# Patient Record
Sex: FEMALE | Race: WHITE | Employment: OTHER | URBAN - METROPOLITAN AREA
[De-identification: names, ages, dates, MRNs, and addresses within clinical notes are randomized per-mention and may not be internally consistent; named-entity substitution may affect disease eponyms.]

---

## 2020-02-20 LAB
CREATININE, URINE: 218.1
MICROALBUMIN/CREAT 24H UR: 45.3 MG/G{CREAT}
MICROALBUMIN/CREAT UR-RTO: 20.8

## 2020-05-07 LAB — DIABETIC RETINOPATHY: NEGATIVE

## 2020-11-05 LAB
ALBUMIN SERPL-MCNC: NORMAL G/DL
ALP BLD-CCNC: NORMAL U/L
ALT SERPL-CCNC: NORMAL U/L
ANION GAP SERPL CALCULATED.3IONS-SCNC: NORMAL MMOL/L
AST SERPL-CCNC: NORMAL U/L
AVERAGE GLUCOSE: NORMAL
BASOPHILS ABSOLUTE: NORMAL
BASOPHILS RELATIVE PERCENT: NORMAL
BILIRUB SERPL-MCNC: NORMAL MG/DL
BUN BLDV-MCNC: NORMAL MG/DL
CALCIUM SERPL-MCNC: NORMAL MG/DL
CHLORIDE BLD-SCNC: NORMAL MMOL/L
CHOLESTEROL, TOTAL: NORMAL
CHOLESTEROL/HDL RATIO: NORMAL
CO2: NORMAL
CREAT SERPL-MCNC: NORMAL MG/DL
EOSINOPHILS ABSOLUTE: NORMAL
EOSINOPHILS RELATIVE PERCENT: NORMAL
GFR CALCULATED: NORMAL
GLUCOSE BLD-MCNC: NORMAL MG/DL
HBA1C MFR BLD: 6.6 %
HCT VFR BLD CALC: NORMAL %
HDLC SERPL-MCNC: NORMAL MG/DL
HEMOGLOBIN: NORMAL
LDL CHOLESTEROL CALCULATED: NORMAL
LYMPHOCYTES ABSOLUTE: NORMAL
LYMPHOCYTES RELATIVE PERCENT: NORMAL
MCH RBC QN AUTO: NORMAL PG
MCHC RBC AUTO-ENTMCNC: NORMAL G/DL
MCV RBC AUTO: NORMAL FL
MONOCYTES ABSOLUTE: NORMAL
MONOCYTES RELATIVE PERCENT: NORMAL
NEUTROPHILS ABSOLUTE: NORMAL
NEUTROPHILS RELATIVE PERCENT: NORMAL
NONHDLC SERPL-MCNC: NORMAL MG/DL
PLATELET # BLD: NORMAL 10*3/UL
PMV BLD AUTO: NORMAL FL
POTASSIUM SERPL-SCNC: NORMAL MMOL/L
RBC # BLD: NORMAL 10*6/UL
SODIUM BLD-SCNC: NORMAL MMOL/L
TOTAL PROTEIN: NORMAL
TRIGL SERPL-MCNC: NORMAL MG/DL
VLDLC SERPL CALC-MCNC: NORMAL MG/DL
WBC # BLD: NORMAL 10*3/UL

## 2021-01-24 ENCOUNTER — PATIENT MESSAGE (OUTPATIENT)
Dept: PRIMARY CARE CLINIC | Age: 66
End: 2021-01-24

## 2021-01-25 VITALS
HEIGHT: 63 IN | DIASTOLIC BLOOD PRESSURE: 78 MMHG | WEIGHT: 246 LBS | RESPIRATION RATE: 18 BRPM | HEART RATE: 55 BPM | BODY MASS INDEX: 43.59 KG/M2 | SYSTOLIC BLOOD PRESSURE: 132 MMHG | TEMPERATURE: 97.2 F

## 2021-01-25 RX ORDER — ATORVASTATIN CALCIUM 40 MG/1
1 TABLET, FILM COATED ORAL DAILY
COMMUNITY
Start: 2021-01-05 | End: 2021-06-25 | Stop reason: SDUPTHER

## 2021-01-25 RX ORDER — LEVOTHYROXINE SODIUM 0.1 MG/1
1 TABLET ORAL DAILY
COMMUNITY
Start: 2020-10-27 | End: 2021-01-25 | Stop reason: SDUPTHER

## 2021-01-25 RX ORDER — LEVOTHYROXINE SODIUM 0.1 MG/1
100 TABLET ORAL DAILY
Qty: 90 TABLET | Refills: 3 | Status: SHIPPED
Start: 2021-01-25 | End: 2021-01-27 | Stop reason: SDUPTHER

## 2021-01-25 NOTE — TELEPHONE ENCOUNTER
From: Tommy Michel  To: Miguelangel Marx MD  Sent: 1/24/2021 7:15 PM EST  Subject: Prescription Question    Please refill my levothyroxine sodium . 100 mg (100 mcg) tablet 1x per day prescription.     My local pharmacy was added to my profile:  [facility]  [place] [phone]    Thank you

## 2021-01-27 RX ORDER — LEVOTHYROXINE SODIUM 0.1 MG/1
100 TABLET ORAL DAILY
Qty: 90 TABLET | Refills: 3 | Status: SHIPPED
Start: 2021-01-27 | End: 2022-01-13 | Stop reason: SDUPTHER

## 2021-03-12 ENCOUNTER — IMMUNIZATION (OUTPATIENT)
Dept: PRIMARY CARE CLINIC | Age: 66
End: 2021-03-12
Payer: MEDICARE

## 2021-03-12 PROCEDURE — 91301 COVID-19, MODERNA VACCINE 100MCG/0.5ML DOSE: CPT | Performed by: PHYSICIAN ASSISTANT

## 2021-03-12 PROCEDURE — 0011A COVID-19, MODERNA VACCINE 100MCG/0.5ML DOSE: CPT | Performed by: PHYSICIAN ASSISTANT

## 2021-03-24 DIAGNOSIS — E03.9 HYPOTHYROIDISM, UNSPECIFIED TYPE: ICD-10-CM

## 2021-03-24 DIAGNOSIS — R73.03 PREDIABETES: ICD-10-CM

## 2021-03-24 DIAGNOSIS — I10 ESSENTIAL HYPERTENSION: ICD-10-CM

## 2021-03-24 DIAGNOSIS — E78.5 HYPERLIPIDEMIA, UNSPECIFIED HYPERLIPIDEMIA TYPE: ICD-10-CM

## 2021-03-24 LAB
ALBUMIN SERPL-MCNC: 4.2 G/DL (ref 3.5–5.2)
ALP BLD-CCNC: 87 U/L (ref 35–104)
ALT SERPL-CCNC: 17 U/L (ref 0–32)
ANION GAP SERPL CALCULATED.3IONS-SCNC: 10 MMOL/L (ref 7–16)
AST SERPL-CCNC: 24 U/L (ref 0–31)
BASOPHILS ABSOLUTE: 0.03 E9/L (ref 0–0.2)
BASOPHILS RELATIVE PERCENT: 0.3 % (ref 0–2)
BILIRUB SERPL-MCNC: 0.5 MG/DL (ref 0–1.2)
BUN BLDV-MCNC: 20 MG/DL (ref 8–23)
CALCIUM SERPL-MCNC: 9.6 MG/DL (ref 8.6–10.2)
CHLORIDE BLD-SCNC: 103 MMOL/L (ref 98–107)
CHOLESTEROL, TOTAL: 149 MG/DL (ref 0–199)
CO2: 27 MMOL/L (ref 22–29)
CREAT SERPL-MCNC: 1 MG/DL (ref 0.5–1)
EOSINOPHILS ABSOLUTE: 0.07 E9/L (ref 0.05–0.5)
EOSINOPHILS RELATIVE PERCENT: 0.7 % (ref 0–6)
GFR AFRICAN AMERICAN: >60
GFR NON-AFRICAN AMERICAN: 55 ML/MIN/1.73
GLUCOSE BLD-MCNC: 124 MG/DL (ref 74–99)
HBA1C MFR BLD: 6.6 % (ref 4–5.6)
HCT VFR BLD CALC: 44.2 % (ref 34–48)
HDLC SERPL-MCNC: 50 MG/DL
HEMOGLOBIN: 14.9 G/DL (ref 11.5–15.5)
IMMATURE GRANULOCYTES #: 0.04 E9/L
IMMATURE GRANULOCYTES %: 0.4 % (ref 0–5)
LDL CHOLESTEROL CALCULATED: 81 MG/DL (ref 0–99)
LYMPHOCYTES ABSOLUTE: 2.49 E9/L (ref 1.5–4)
LYMPHOCYTES RELATIVE PERCENT: 23.6 % (ref 20–42)
MCH RBC QN AUTO: 32.3 PG (ref 26–35)
MCHC RBC AUTO-ENTMCNC: 33.7 % (ref 32–34.5)
MCV RBC AUTO: 95.7 FL (ref 80–99.9)
MONOCYTES ABSOLUTE: 1.05 E9/L (ref 0.1–0.95)
MONOCYTES RELATIVE PERCENT: 10 % (ref 2–12)
NEUTROPHILS ABSOLUTE: 6.85 E9/L (ref 1.8–7.3)
NEUTROPHILS RELATIVE PERCENT: 65 % (ref 43–80)
PDW BLD-RTO: 12.7 FL (ref 11.5–15)
PLATELET # BLD: 245 E9/L (ref 130–450)
PMV BLD AUTO: 10.1 FL (ref 7–12)
POTASSIUM SERPL-SCNC: 4.1 MMOL/L (ref 3.5–5)
RBC # BLD: 4.62 E12/L (ref 3.5–5.5)
SODIUM BLD-SCNC: 140 MMOL/L (ref 132–146)
TOTAL PROTEIN: 7.4 G/DL (ref 6.4–8.3)
TRIGL SERPL-MCNC: 91 MG/DL (ref 0–149)
TSH SERPL DL<=0.05 MIU/L-ACNC: 2.18 UIU/ML (ref 0.27–4.2)
VLDLC SERPL CALC-MCNC: 18 MG/DL
WBC # BLD: 10.5 E9/L (ref 4.5–11.5)

## 2021-04-07 ENCOUNTER — OFFICE VISIT (OUTPATIENT)
Dept: PRIMARY CARE CLINIC | Age: 66
End: 2021-04-07
Payer: MEDICARE

## 2021-04-07 VITALS
HEART RATE: 64 BPM | RESPIRATION RATE: 17 BRPM | WEIGHT: 250 LBS | SYSTOLIC BLOOD PRESSURE: 120 MMHG | HEIGHT: 63 IN | OXYGEN SATURATION: 96 % | TEMPERATURE: 98 F | BODY MASS INDEX: 44.3 KG/M2 | DIASTOLIC BLOOD PRESSURE: 62 MMHG

## 2021-04-07 DIAGNOSIS — E66.01 CLASS 3 SEVERE OBESITY DUE TO EXCESS CALORIES WITH SERIOUS COMORBIDITY AND BODY MASS INDEX (BMI) OF 40.0 TO 44.9 IN ADULT (HCC): ICD-10-CM

## 2021-04-07 DIAGNOSIS — R26.9 GAIT ABNORMALITY: ICD-10-CM

## 2021-04-07 DIAGNOSIS — M17.0 PRIMARY OSTEOARTHRITIS OF BOTH KNEES: Primary | ICD-10-CM

## 2021-04-07 DIAGNOSIS — E11.9 TYPE 2 DIABETES MELLITUS WITHOUT COMPLICATION, WITHOUT LONG-TERM CURRENT USE OF INSULIN (HCC): ICD-10-CM

## 2021-04-07 DIAGNOSIS — E03.9 HYPOTHYROIDISM, UNSPECIFIED TYPE: ICD-10-CM

## 2021-04-07 PROCEDURE — 99214 OFFICE O/P EST MOD 30 MIN: CPT | Performed by: INTERNAL MEDICINE

## 2021-04-07 RX ORDER — VALSARTAN AND HYDROCHLOROTHIAZIDE 320; 25 MG/1; MG/1
TABLET, FILM COATED ORAL
COMMUNITY
Start: 2021-03-02 | End: 2021-08-27 | Stop reason: SDUPTHER

## 2021-04-07 RX ORDER — METOPROLOL TARTRATE 50 MG/1
TABLET, FILM COATED ORAL
COMMUNITY
Start: 2021-03-01 | End: 2021-06-08 | Stop reason: SDUPTHER

## 2021-04-07 RX ORDER — METFORMIN HYDROCHLORIDE 750 MG/1
TABLET, EXTENDED RELEASE ORAL
COMMUNITY
Start: 2021-03-01 | End: 2021-08-30 | Stop reason: SDUPTHER

## 2021-04-07 RX ORDER — SEMAGLUTIDE 1.34 MG/ML
0.5 INJECTION, SOLUTION SUBCUTANEOUS
Qty: 1 PEN | Refills: 0 | Status: SHIPPED | OUTPATIENT
Start: 2021-04-07 | End: 2021-05-06

## 2021-04-07 SDOH — HEALTH STABILITY: MENTAL HEALTH: HOW OFTEN DO YOU HAVE A DRINK CONTAINING ALCOHOL?: NEVER

## 2021-04-07 SDOH — ECONOMIC STABILITY: TRANSPORTATION INSECURITY
IN THE PAST 12 MONTHS, HAS THE LACK OF TRANSPORTATION KEPT YOU FROM MEDICAL APPOINTMENTS OR FROM GETTING MEDICATIONS?: NO

## 2021-04-07 SDOH — ECONOMIC STABILITY: FOOD INSECURITY: WITHIN THE PAST 12 MONTHS, THE FOOD YOU BOUGHT JUST DIDN'T LAST AND YOU DIDN'T HAVE MONEY TO GET MORE.: NEVER TRUE

## 2021-04-07 SDOH — ECONOMIC STABILITY: FOOD INSECURITY: WITHIN THE PAST 12 MONTHS, YOU WORRIED THAT YOUR FOOD WOULD RUN OUT BEFORE YOU GOT MONEY TO BUY MORE.: NEVER TRUE

## 2021-04-07 SDOH — ECONOMIC STABILITY: INCOME INSECURITY: HOW HARD IS IT FOR YOU TO PAY FOR THE VERY BASICS LIKE FOOD, HOUSING, MEDICAL CARE, AND HEATING?: NOT ASKED

## 2021-04-07 SDOH — ECONOMIC STABILITY: TRANSPORTATION INSECURITY
IN THE PAST 12 MONTHS, HAS LACK OF TRANSPORTATION KEPT YOU FROM MEETINGS, WORK, OR FROM GETTING THINGS NEEDED FOR DAILY LIVING?: NO

## 2021-04-07 ASSESSMENT — ENCOUNTER SYMPTOMS
ABDOMINAL PAIN: 0
FACIAL SWELLING: 0
WHEEZING: 0
SHORTNESS OF BREATH: 1
COUGH: 0
CONSTIPATION: 0
DIARRHEA: 0

## 2021-04-07 ASSESSMENT — PATIENT HEALTH QUESTIONNAIRE - PHQ9
2. FEELING DOWN, DEPRESSED OR HOPELESS: 0
SUM OF ALL RESPONSES TO PHQ9 QUESTIONS 1 & 2: 0
1. LITTLE INTEREST OR PLEASURE IN DOING THINGS: 0
SUM OF ALL RESPONSES TO PHQ QUESTIONS 1-9: 0
SUM OF ALL RESPONSES TO PHQ QUESTIONS 1-9: 0

## 2021-04-07 NOTE — PROGRESS NOTES
HPI:  Patient comes in today for complaint of pain in the knees bilaterally patient ability to ambulate and exercise is impaired, patient has bone-on-bone knees bilaterally and is unable to get surgery done because she is unable to lose weight patient has seen for surgeons in the past last surgeon was Dr. Dr. Kavon Mcintosh, patient requested to lose 40 pounds in order to have surgery done. Patient have tried dieting several times and failed. Patient very frustrated  Review of Systems   Constitutional: Negative for chills, fatigue and fever. HENT: Negative for congestion, facial swelling and postnasal drip. Respiratory: Positive for shortness of breath (With exertion. ). Negative for cough and wheezing. Cardiovascular: Negative for chest pain, palpitations and leg swelling. Gastrointestinal: Negative for abdominal pain, constipation and diarrhea. Genitourinary: Negative. Musculoskeletal: Positive for arthralgias and gait problem. Negative for myalgias. Skin: Negative. Psychiatric/Behavioral: Negative. Past Medical History:   Diagnosis Date    Chronic kidney disease     Diabetes mellitus ([facility] [address]     Hearing loss     Hypothyroidism     Obesity     Osteoarthritis     Thoracic and lumbosacral neuritis     Trochanteric bursitis      No past surgical history on file. No family history on file.    Social History     Tobacco Use    Smoking status: Former Smoker     Packs/day: 0.25     Years: 10.00     Pack years: 2.50     Types: Cigarettes     Quit date: 1981     Years since quittin.2    Smokeless tobacco: Never Used   Substance Use Topics    Alcohol use: Never     Frequency: Never     Binge frequency: Never    Drug use: Not on file        Current Outpatient Medications on File Prior to Visit   Medication Sig Dispense Refill    metFORMIN (GLUCOPHAGE-XR) 750 MG extended release tablet TAKE 1 TABLET BY MOUTH WITH BREAKFAST AND 1 TABLET BY MOUTH WITH DINNER EVERY DAY      metoprolol tartrate (LOPRESSOR) 50 MG tablet TAKE 1 TABLET BY MOUTH TWICE DAILY      valsartan-hydroCHLOROthiazide (DIOVAN-HCT) 320-25 MG per tablet TAKE 1 TABLET BY MOUTH DAILY FOR HIGH BLOOD PRESSURE      levothyroxine (SYNTHROID) 100 MCG tablet Take 1 tablet by mouth daily 90 tablet 3    atorvastatin (LIPITOR) 40 MG tablet Take 1 tablet by mouth daily       No current facility-administered medications on file prior to visit. PE:  VS:  /62 (Site: Left Wrist, Position: Sitting, Cuff Size: Large Adult)   Pulse 64   Temp 98 °F (36.7 °C) (Temporal)   Resp 17   Ht 5' 3\" (1.6 m)   Wt 250 lb (113.4 kg)   SpO2 96%   BMI 44.29 kg/m²   General:  Alert and oriented, NAD  HEENT:  TMs, EACs and auricles are normal, ALBERTO, EOMI, Conjunctivae clear       Throat currently clear. NECK:  Supple without adenopathy or thyromegaly, no carotid bruits. LUNGS:  CTA all fields  HEART:  RRR without M, R, or G  ABDOMEN:  Soft and nontender without palpable abnormalities, No renal or aortic bruits. EXTREMITIES:  Without clubbing, cyanosis, or edema, 2+ DP/PT pulses B    Lab Results   Component Value Date    WBC 10.5 03/24/2021    HGB 14.9 03/24/2021    HCT 44.2 03/24/2021     03/24/2021    CHOL 149 03/24/2021    TRIG 91 03/24/2021    HDL 50 03/24/2021    ALT 17 03/24/2021    AST 24 03/24/2021     03/24/2021    K 4.1 03/24/2021     03/24/2021    CREATININE 1.0 03/24/2021    BUN 20 03/24/2021    CO2 27 03/24/2021    TSH 2.180 03/24/2021    LABA1C 6.6 (H) 03/24/2021        Impression/Plan:    1. Primary osteoarthritis of both knees  -     [facility]  2. Gait abnormality  -     [facility] [place]  3.  Type 2 diabetes mellitus without complication, without long-term current use of insulin ([facility] [address]  Assessment & Plan:  Patient will be started on Ozempic to continue control of her diabetes, may have to switch to Trulicity because of formulary patient started on a sample of Ozempic and will check coverage with her pharmacy. 4. Hypothyroidism, unspecified type  5. Class 3 severe obesity due to excess calories with serious comorbidity and body mass index (BMI) of 40.0 to 44.9 in adult [facility])  Assessment & Plan:  Patient is unable to lose weight because of difficulty with mobility, patient is willing to add Ozempic to her Metformin in an attempt to use it for 4 to 6 months try to improve her weight so she can be eligible for surgery for her knees, discussed with the patient possible adverse effects, patient has no personal or family history of thyroid cancer.   Mobility and

## 2021-04-08 NOTE — ASSESSMENT & PLAN NOTE
Patient is unable to lose weight because of difficulty with mobility, patient is willing to add Ozempic to her Metformin in an attempt to use it for 4 to 6 months try to improve her weight so she can be eligible for surgery for her knees, discussed with the patient possible adverse effects, patient has no personal or family history of thyroid cancer.   Mobility and

## 2021-04-12 ENCOUNTER — IMMUNIZATION (OUTPATIENT)
Dept: PRIMARY CARE CLINIC | Age: 66
End: 2021-04-12
Payer: MEDICARE

## 2021-04-12 PROCEDURE — 91301 COVID-19, MODERNA VACCINE 100MCG/0.5ML DOSE: CPT | Performed by: PHYSICIAN ASSISTANT

## 2021-04-12 PROCEDURE — 0012A COVID-19, MODERNA VACCINE 100MCG/0.5ML DOSE: CPT | Performed by: PHYSICIAN ASSISTANT

## 2021-04-13 ENCOUNTER — EVALUATION (OUTPATIENT)
Dept: PHYSICAL THERAPY | Age: 66
End: 2021-04-13
Payer: MEDICARE

## 2021-04-13 DIAGNOSIS — R26.9 ABNORMALITY OF GAIT: ICD-10-CM

## 2021-04-13 DIAGNOSIS — M17.0 PRIMARY OSTEOARTHRITIS OF BOTH KNEES: Primary | ICD-10-CM

## 2021-04-13 PROCEDURE — 97161 PT EVAL LOW COMPLEX 20 MIN: CPT | Performed by: PHYSICAL THERAPIST

## 2021-04-13 NOTE — PROGRESS NOTES
[facility]   Phone: [phone]   Fax: [phone]         Date:  2021   Patient: Mike Wiley  : 1955  MRN: <[id number]>  Referring Provider: Imani Mandujano MD  [address] [place] [address]     Medical Diagnosis:      Diagnosis Orders   1. Primary osteoarthritis of both knees     2. Abnormality of gait          SUBJECTIVE:     Onset date: 21    Onset: Pt with a 12 year history of bilateral knee pain and osteoarthritis. Mechanism of Injury: chronic     Previous PT: yes - did not help     Medical Management for Current Problem: Pt planning on B TKA after weight loss    Chief complaint: pain, weakness and difficulty walking    Behavior: condition is getting worse    Pain: gradually worsening  Current: 8/10     Best: 8/10     Worst:10/10    Symptom Type/Quality: N/A  Location[de-identified] Knee: global bilateral knees    Aggravated by: walking, standing    Relieved by: rest    Imaging results: No results found. Past Medical History:  Past Medical History:   Diagnosis Date    Chronic kidney disease     Diabetes mellitus ([address]     Hearing loss     Hypothyroidism     Obesity     Osteoarthritis     Thoracic and lumbosacral neuritis     Trochanteric bursitis      No past surgical history on file.     Medications:   Current Outpatient Medications   Medication Sig Dispense Refill    metFORMIN (GLUCOPHAGE-XR) 750 MG extended release tablet TAKE 1 TABLET BY MOUTH WITH BREAKFAST AND 1 TABLET BY MOUTH WITH DINNER EVERY DAY      metoprolol tartrate (LOPRESSOR) 50 MG tablet TAKE 1 TABLET BY MOUTH TWICE DAILY      valsartan-hydroCHLOROthiazide (DIOVAN-HCT) 320-25 MG per tablet TAKE 1 TABLET BY MOUTH DAILY FOR HIGH BLOOD PRESSURE      Semaglutide, 1 MG/DOSE, (OZEMPIC, 1 MG/DOSE,) 2 MG/1.5ML SOPN Inject 0.5 mg into the skin every 7 days 1 pen 0    levothyroxine (SYNTHROID) 100 MCG tablet Take 1 tablet by mouth daily 90 tablet 3    atorvastatin (LIPITOR) 40 MG tablet Take 1 tablet by mouth daily       No current facility-administered medications for this visit. Occupation: retired. Exercise regimen: seated exercises    Hobbies: none    Patient Goals: to strengthen muscles around knee    Precautions/Contraindications: falls risk    OBJECTIVE:     Observations: well nourished female    Edema: mild global    Gait: altered with short step length, width, height, ambulates with wheeled walker, Trendelenburg . Able to ambulate short distances without a device. Joint/Motion:    Knee:  Right:   AROM: 95° Flexion,  0° Extension    Left:   AROM: 95° Flexion,  0° Extension      Strength:    Knee:   Right: Hip: 4-/5, Knee: Flexion 4/5,  Extension 4/5  Left: Hip: 4-/5, Knee: Flexion 4/5,  Extension 4/5      Functional Mobility:  Stairs: non-reciprocal pattern leading with R LE ascending and descending with 1 rail on left ascending, with body turned sideways. Transfers: increased time and moderate difficulty noted to complete transfer. Special Tests/Functional Screens:    [] Lachman's []+ / [] -    [] Anterior Drawer []+ / [] -   [] Valgus Stress []+ / [] -  [] Thessaly Test []+ / [] -   [] Ilan's Sign []+ / [] -   [] Apley compression: []+ / [] - [] Bounce Home []+ / [] -   [] Rodrick []+ / [] -   [] Pivot Shift []+ / [] -   [] Posterior Drawer []+ / [] -   [] Varus Stress []+ / [] -   [] Patellar Tracking: []+ / [] -     Special test comments: N/A      ASSESSMENT       Problems:    Pain reported 8/10   ROM decreased    Strength decreased    Decreased functional ability with walking, stairs, standing    Reason for Skilled Care: problem list as noted above and to prepare pt for upcoming TKA. [x] There are no barriers affecting plan of care or recovery    [] Barriers to this patient's plan of care or recovery include.     Domestic Concerns:  [x] No  [] Yes:    Short Term goals (2 weeks)   Decrease reported pain to 6/10   Increase ROM to 0° to 100°   Increase Strength by 1/2 MMT grade    Able to perform/complete the following functions/tasks: complete sit to stand transfers with minimal difficulty     Long Term goals (4 weeks)   Decrease reported pain to 4-5/10   Increase ROM to 0° to 105°   Increase Strength by 1 MMT grade    Able to perform/complete the following functions/tasks: complete sit to stand transfers without significant difficulty.  Independent with Home Exercise Programs    Rehab Potential: [x] Good  [] Fair  [] Poor    PLAN       Treatment Plan:   [x] Therapeutic Exercise  [x] Therapeutic Activity  [x] Neuromuscular Re-education   [x] Gait Training  [] Balance Training  [] Aerobic conditioning  [x] Manual Therapy  [] Massage/Fascial release   [] Work/Sport specific activities    [] Pain Neuroscience [x] Cold/hotpack  [] Vasocompression  [] Electrical Stimulation  [] Lumbar/Cervical Traction  [] Ultrasound   [] Iontophoresis: 4 mg/mL Dexamethasone Sodium Phosphate 40-80 mAmin  [] Dry Needling      [x] Instruction in HEP      []  Medication allergies reviewed for use of Dexamethasone Sodium Phosphate 4mg/ml  with iontophoresis treatments. Patient is not allergic. The following CPT codes are likely to be used in the care of this patient: 62664 PT Evaluation: Low Complexity , 53414 PT Re-Evaluation , 02112 Therapeutic Exercise , 91995 Neuromuscular Re-Education , 92368 Therapeutic Activities , 52009 Manual Therapy       Suggested Professional Referral: [x] No  [] Yes:     Patient Education:  [x] Plans/Goals, Risks/Benefits discussed  [x] Home exercise program  Method of Education: [x] Verbal  [x] Demo  [x] Written  Comprehension of Education:  [x] Verbalizes understanding. [x] Demonstrates understanding. [] Needs Review. [] Demonstrates/verbalizes understanding of HEP/Ed previously given.     Frequency: 2 days per week for 4 weeks    Patient understands diagnosis/prognosis and consents to treatment, plan and goals: [x] Yes    [] No     Thank you for the opportunity to work with your patient. If you have questions or comments, please contact me at numbers listed above. Electronically signed by: Jennifer Contreras, [address] [facility] [address]         Please sign Physician's Certification and return to: [facility]  PHYSICAL THERAPY  [address] [facility] [address] [phone]  Dept: [phone]  Dept Fax: [phone] Certification / Comments     Frequency/Duration 2 days per week for 4 weeks. Certification period from 4/13/2021  to 7/12/2021. I have reviewed the Plan of Care established for skilled therapy services and certify that the services are required and that they will be provided while the patient is under my care.     Physician's Comments/Revisions:               Physician's Printed Name:                                           [de-identified] Signature:                                                               Date:

## 2021-04-13 NOTE — PROGRESS NOTES
[facility]          Phone: [phone] Fax: [phone]    Physical Therapy Daily Treatment Note  Date:  2021    Patient Name:  Paulina Martin    :  1955  MRN: <[id number]>    Restrictions/Precautions:    Diagnosis:  Primary osteoarthritis of both knees, abnormality of gait  Treatment Diagnosis:  P04.6  Insurance/Certification information:  Medicare  21 to 21  Referring Physician:  Toya Abdullahi MD  Plan of care signed (Y/N):    Visit# / total visits:    Pain level: 8/10   Time In:    Time Out:  825    Subjective:  See evaluation    Exercises:  Exercise/Equipment Resistance/Repetitions Other comments     stepper       SAQ/LAQ       SLR       Hip add/abb       Heel slides       Knee flexion       Sit to stand                                                                                              Other Therapeutic Activities:  PT evaluation completed    Home Exercise Program:  N/A    Manual Treatments:  N/A    Modalities:  N/A     Time-in Time-out Total Time   27440  Evaluation Low Complexity 0755 0825 30   34837  Evaluation Med Complexity      45247  Evaluation High Complexity      90774  Ther Ex      54182  Neuro Re-ed        30753  Ther Activities        92305  Manual Therapy       42997  E-stim       47441  Ultrasound            Session 0755 0825 30       Treatment/Activity Tolerance:  [x] Patient tolerated treatment well [] Patient limited by fatigue  [] Patient limited by pain  [] Patient limited by other medical complications  [] Other:     Prognosis: [x] Good [] Fair  [] Poor    Patient Requires Follow-up: [x] Yes  [] No    Plan:   [] Continue per plan of care [] Alter current plan (see comments)  [x] Plan of care initiated [] Hold pending MD visit [] Discharge  Plan for Next Session:        Electronically signed by:  Jesus Zamarripa PT [id number]

## 2021-04-16 ENCOUNTER — TREATMENT (OUTPATIENT)
Dept: PHYSICAL THERAPY | Age: 66
End: 2021-04-16
Payer: MEDICARE

## 2021-04-16 DIAGNOSIS — R26.9 ABNORMALITY OF GAIT: ICD-10-CM

## 2021-04-16 DIAGNOSIS — M17.0 PRIMARY OSTEOARTHRITIS OF BOTH KNEES: Primary | ICD-10-CM

## 2021-04-16 PROCEDURE — 97110 THERAPEUTIC EXERCISES: CPT | Performed by: PHYSICAL THERAPIST

## 2021-04-16 NOTE — PROGRESS NOTES
[facility]          Phone: [phone] Fax: [phone]    Physical Therapy Daily Treatment Note  Date:  2021    Patient Name:  Ledy Pedersen    :  1955  MRN: <[id number]>    Restrictions/Precautions:    Diagnosis:  Primary osteoarthritis of both knees, abnormality of gait  Treatment Diagnosis:  B76.5  Insurance/Certification information:  Medicare  21 to 21  Referring Physician:  Jammie Melchor MD  Plan of care signed (Y/N):    Visit# / total visits:    Pain level: 8/10   Time In:  0852  Time Out:  09    Subjective:  No new report    Exercises:  Exercise/Equipment Resistance/Repetitions Other comments     stepper 10 minutes       SAQ x10 reps B LE      SLR x10 reps B LE      Hip add/abd Add with ball, abd with OTB x 10 reps each      Heel slides x10 reps B LE      Knee flexion Seated, OTB, x 10 reps B LE      Sit to stand 2 x 5 reps from mat      Step-ups 4\" step x 10 reps B LE                                                                                      Other Therapeutic Activities:  N/A    Home Exercise Program:  N/A    Manual Treatments:  N/A    Modalities:  N/A     Time-in Time-out Total Time   02225  Evaluation Low Complexity      16221  Evaluation Med Complexity      05365  Evaluation High Complexity      51885  Ther Ex 0852 8206 20   90621  Neuro Re-ed        89528  Ther Activities        55816  Manual Therapy       56810  E-stim       75275  Ultrasound            Session 4583 5680 45       Treatment/Activity Tolerance:  [x] Patient tolerated treatment well [] Patient limited by fatigue  [] Patient limited by pain  [] Patient limited by other medical complications  [] Other:     Prognosis: [x] Good [] Fair  [] Poor    Patient Requires Follow-up: [x] Yes  [] No    Plan:   [x] Continue per plan of care [] Alter current plan (see comments)  [] Plan of care initiated [] Hold pending MD visit [] Discharge  Plan for Next Session: Electronically signed by:  Yonatan Teresa, Kay Form[address] [place] [id number]

## 2021-04-22 ENCOUNTER — TREATMENT (OUTPATIENT)
Dept: PHYSICAL THERAPY | Age: 66
End: 2021-04-22
Payer: MEDICARE

## 2021-04-22 DIAGNOSIS — M17.0 PRIMARY OSTEOARTHRITIS OF BOTH KNEES: Primary | ICD-10-CM

## 2021-04-22 DIAGNOSIS — R26.9 ABNORMALITY OF GAIT: ICD-10-CM

## 2021-04-22 PROCEDURE — 97110 THERAPEUTIC EXERCISES: CPT | Performed by: PHYSICAL THERAPIST

## 2021-04-22 NOTE — PROGRESS NOTES
[facility]          Phone: [phone] Fax: [phone]    Physical Therapy Daily Treatment Note  Date:  2021    Patient Name:  Joy Brittle    :  1955  MRN: <[id number]>    Restrictions/Precautions:    Diagnosis:  Primary osteoarthritis of both knees, abnormality of gait  Treatment Diagnosis:  V59.7  Insurance/Certification information:  Medicare  21 to 21  Referring Physician:  Sheila Sanchez MD  Plan of care signed (Y/N):    Visit# / total visits:  3/8  Pain level: 8/10   Time In:  0956  Time Out:  1045    Subjective:  No new report    Exercises:  Exercise/Equipment Resistance/Repetitions Other comments     stepper 10 minutes       SAQ x15 reps B LE      SLR x10 reps B LE      Hip add/abd Add with ball, abd with OTB x 20 reps each      Heel slides x15 reps B LE      Knee flexion Seated, OTB, x 15 reps B LE      Sit to stand x 10 reps from mat      Step-ups 4\" step x 10 reps B LE      Standing hip abd and ext x10 reps B LE                                                                               Other Therapeutic Activities:  N/A    Home Exercise Program:  N/A    Manual Treatments:  N/A    Modalities:  N/A     Time-in Time-out Total Time   69639  Evaluation Low Complexity      58707  Evaluation Med Complexity      84087  Evaluation High Complexity      50243  Ther Ex 6888 7918 26   00704  Neuro Re-ed        11373  Ther Activities        60096  Manual Therapy       34120  E-stim       57608  Ultrasound            Session 5810 4942 40       Treatment/Activity Tolerance:  [] Patient tolerated treatment well [] Patient limited by fatigue  [] Patient limited by pain  [] Patient limited by other medical complications  [x] Other: increased difficulty with exercises today due to increased back pain    Prognosis: [x] Good [] Fair  [] Poor    Patient Requires Follow-up: [x] Yes  [] No    Plan:   [x] Continue per plan of care [] Alter current plan (see comments) [] Plan of care initiated [] Hold pending MD visit [] Discharge  Plan for Next Session:        Electronically signed by:  Kay Reid [facility]

## 2021-05-03 DIAGNOSIS — I10 ESSENTIAL HYPERTENSION: ICD-10-CM

## 2021-05-03 DIAGNOSIS — E03.9 HYPOTHYROIDISM, UNSPECIFIED TYPE: ICD-10-CM

## 2021-05-03 DIAGNOSIS — E78.5 HYPERLIPIDEMIA, UNSPECIFIED HYPERLIPIDEMIA TYPE: ICD-10-CM

## 2021-05-03 DIAGNOSIS — R73.03 PREDIABETES: ICD-10-CM

## 2021-05-03 LAB
ALBUMIN SERPL-MCNC: 3.9 G/DL (ref 3.5–5.2)
ALP BLD-CCNC: 74 U/L (ref 35–104)
ALT SERPL-CCNC: 14 U/L (ref 0–32)
ANION GAP SERPL CALCULATED.3IONS-SCNC: 14 MMOL/L (ref 7–16)
AST SERPL-CCNC: 25 U/L (ref 0–31)
BASOPHILS ABSOLUTE: 0.02 E9/L (ref 0–0.2)
BASOPHILS RELATIVE PERCENT: 0.3 % (ref 0–2)
BILIRUB SERPL-MCNC: 0.5 MG/DL (ref 0–1.2)
BUN BLDV-MCNC: 18 MG/DL (ref 6–23)
CALCIUM SERPL-MCNC: 10 MG/DL (ref 8.6–10.2)
CHLORIDE BLD-SCNC: 104 MMOL/L (ref 98–107)
CHOLESTEROL, TOTAL: 132 MG/DL (ref 0–199)
CO2: 23 MMOL/L (ref 22–29)
CREAT SERPL-MCNC: 0.9 MG/DL (ref 0.5–1)
EOSINOPHILS ABSOLUTE: 0.06 E9/L (ref 0.05–0.5)
EOSINOPHILS RELATIVE PERCENT: 0.8 % (ref 0–6)
GFR AFRICAN AMERICAN: >60
GFR NON-AFRICAN AMERICAN: >60 ML/MIN/1.73
GLUCOSE BLD-MCNC: 112 MG/DL (ref 74–99)
HBA1C MFR BLD: 6.2 % (ref 4–5.6)
HCT VFR BLD CALC: 42.8 % (ref 34–48)
HDLC SERPL-MCNC: 52 MG/DL
HEMOGLOBIN: 14.7 G/DL (ref 11.5–15.5)
IMMATURE GRANULOCYTES #: 0.02 E9/L
IMMATURE GRANULOCYTES %: 0.3 % (ref 0–5)
LDL CHOLESTEROL CALCULATED: 70 MG/DL (ref 0–99)
LYMPHOCYTES ABSOLUTE: 2.39 E9/L (ref 1.5–4)
LYMPHOCYTES RELATIVE PERCENT: 33.3 % (ref 20–42)
MCH RBC QN AUTO: 32.6 PG (ref 26–35)
MCHC RBC AUTO-ENTMCNC: 34.3 % (ref 32–34.5)
MCV RBC AUTO: 94.9 FL (ref 80–99.9)
MONOCYTES ABSOLUTE: 0.81 E9/L (ref 0.1–0.95)
MONOCYTES RELATIVE PERCENT: 11.3 % (ref 2–12)
NEUTROPHILS ABSOLUTE: 3.88 E9/L (ref 1.8–7.3)
NEUTROPHILS RELATIVE PERCENT: 54 % (ref 43–80)
PDW BLD-RTO: 12.7 FL (ref 11.5–15)
PLATELET # BLD: 237 E9/L (ref 130–450)
PMV BLD AUTO: 10.3 FL (ref 7–12)
POTASSIUM SERPL-SCNC: 4.1 MMOL/L (ref 3.5–5)
RBC # BLD: 4.51 E12/L (ref 3.5–5.5)
SODIUM BLD-SCNC: 141 MMOL/L (ref 132–146)
TOTAL PROTEIN: 7.1 G/DL (ref 6.4–8.3)
TRIGL SERPL-MCNC: 52 MG/DL (ref 0–149)
TSH SERPL DL<=0.05 MIU/L-ACNC: 0.61 UIU/ML (ref 0.27–4.2)
VLDLC SERPL CALC-MCNC: 10 MG/DL
WBC # BLD: 7.2 E9/L (ref 4.5–11.5)

## 2021-05-06 ENCOUNTER — OFFICE VISIT (OUTPATIENT)
Dept: PRIMARY CARE CLINIC | Age: 66
End: 2021-05-06
Payer: MEDICARE

## 2021-05-06 VITALS
DIASTOLIC BLOOD PRESSURE: 78 MMHG | HEIGHT: 63 IN | OXYGEN SATURATION: 96 % | BODY MASS INDEX: 43.41 KG/M2 | SYSTOLIC BLOOD PRESSURE: 122 MMHG | WEIGHT: 245 LBS | RESPIRATION RATE: 19 BRPM | TEMPERATURE: 97.6 F | HEART RATE: 76 BPM

## 2021-05-06 DIAGNOSIS — E66.01 CLASS 3 SEVERE OBESITY DUE TO EXCESS CALORIES WITH SERIOUS COMORBIDITY AND BODY MASS INDEX (BMI) OF 40.0 TO 44.9 IN ADULT (HCC): ICD-10-CM

## 2021-05-06 DIAGNOSIS — E03.9 HYPOTHYROIDISM, UNSPECIFIED TYPE: ICD-10-CM

## 2021-05-06 DIAGNOSIS — E11.9 TYPE 2 DIABETES MELLITUS WITHOUT COMPLICATION, WITHOUT LONG-TERM CURRENT USE OF INSULIN (HCC): Primary | ICD-10-CM

## 2021-05-06 DIAGNOSIS — E11.9 TYPE 2 DIABETES MELLITUS WITHOUT COMPLICATION, WITHOUT LONG-TERM CURRENT USE OF INSULIN (HCC): ICD-10-CM

## 2021-05-06 DIAGNOSIS — R11.2 NON-INTRACTABLE VOMITING WITH NAUSEA, UNSPECIFIED VOMITING TYPE: ICD-10-CM

## 2021-05-06 DIAGNOSIS — E78.2 MIXED HYPERLIPIDEMIA: ICD-10-CM

## 2021-05-06 PROCEDURE — 99214 OFFICE O/P EST MOD 30 MIN: CPT | Performed by: INTERNAL MEDICINE

## 2021-05-06 ASSESSMENT — ENCOUNTER SYMPTOMS
COUGH: 0
SHORTNESS OF BREATH: 0
SINUS PAIN: 0
EYES NEGATIVE: 1
GASTROINTESTINAL NEGATIVE: 1
CHEST TIGHTNESS: 0

## 2021-05-06 NOTE — PROGRESS NOTES
will recheck A1c every 3 months. Orders:  -     AMYLASE; Future  -     LIPASE; Future  2. Hypothyroidism, unspecified type  Assessment & Plan:  TSH in the therapeutic range continue current dose of 100 mcg of thyroxine. 3. Class 3 severe obesity due to excess calories with serious comorbidity and body mass index (BMI) of 40.0 to 44.9 in adult [facility])  Assessment & Plan:  Discussed with the patient how to lose weight mainly avoiding sweets sugars starches and all carbs, patient can have 3 meals of protein vegetables fruit and if she decrease her calories to 1400/day she will lose weight without medication. 4. Mixed hyperlipidemia  5. Non-intractable vomiting with nausea, unspecified vomiting type  -     AMYLASE; Future  -     LIPASE; Future  Patient counseled encouraged to continue weight loss, the orthopedic is saying she has to go down to 220 pounds before he can do surgery. In view of the vomiting we will draw amylase and lipase to evaluate if the patient had a mild case of pancreatitis due to the medication.

## 2021-05-07 LAB
AMYLASE: 76 U/L (ref 20–100)
LIPASE: 49 U/L (ref 13–60)

## 2021-06-08 RX ORDER — METOPROLOL TARTRATE 50 MG/1
TABLET, FILM COATED ORAL
Qty: 60 TABLET | Refills: 1 | Status: SHIPPED
Start: 2021-06-08 | End: 2021-08-11 | Stop reason: SDUPTHER

## 2021-06-08 NOTE — TELEPHONE ENCOUNTER
Last Appointment:  5/6/2021  Future Appointments   Date Time Provider Jose Hanna   8/11/2021 11:00 AM MD Everett Gotti 55

## 2021-06-25 RX ORDER — ATORVASTATIN CALCIUM 40 MG/1
40 TABLET, FILM COATED ORAL DAILY
Qty: 30 TABLET | Refills: 3 | Status: SHIPPED
Start: 2021-06-25 | End: 2021-11-03 | Stop reason: SDUPTHER

## 2021-06-25 NOTE — TELEPHONE ENCOUNTER
Requested Prescriptions     Pending Prescriptions Disp Refills    atorvastatin (LIPITOR) 40 MG tablet 30 tablet 3     Sig: Take 1 tablet by mouth daily     Last appointment - 5/6/2021  Next apointment - 8/11/2021

## 2021-07-28 ENCOUNTER — TELEPHONE (OUTPATIENT)
Dept: ORTHOPEDIC SURGERY | Age: 66
End: 2021-07-28

## 2021-07-28 ENCOUNTER — HOSPITAL ENCOUNTER (EMERGENCY)
Age: 66
Discharge: HOME | End: 2021-07-28
Attending: EMERGENCY MEDICINE
Payer: MEDICARE

## 2021-07-28 ENCOUNTER — APPOINTMENT (OUTPATIENT)
Dept: GENERAL RADIOLOGY | Age: 66
End: 2021-07-28
Payer: MEDICARE

## 2021-07-28 VITALS
DIASTOLIC BLOOD PRESSURE: 84 MMHG | BODY MASS INDEX: 40.97 KG/M2 | SYSTOLIC BLOOD PRESSURE: 138 MMHG | HEART RATE: 85 BPM | OXYGEN SATURATION: 97 % | WEIGHT: 240 LBS | HEIGHT: 64 IN | RESPIRATION RATE: 16 BRPM | TEMPERATURE: 97 F

## 2021-07-28 DIAGNOSIS — M67.442 GANGLION CYST OF FINGER OF LEFT HAND: Primary | ICD-10-CM

## 2021-07-28 PROCEDURE — 99282 EMERGENCY DEPT VISIT SF MDM: CPT

## 2021-07-28 PROCEDURE — 73130 X-RAY EXAM OF HAND: CPT

## 2021-07-28 PROCEDURE — 6370000000 HC RX 637 (ALT 250 FOR IP): Performed by: EMERGENCY MEDICINE

## 2021-07-28 RX ORDER — NAPROXEN 500 MG/1
500 TABLET ORAL 2 TIMES DAILY PRN
Qty: 60 TABLET | Refills: 0 | Status: SHIPPED | OUTPATIENT
Start: 2021-07-28 | End: 2021-08-11

## 2021-07-28 RX ORDER — IBUPROFEN 400 MG/1
400 TABLET ORAL ONCE
Status: COMPLETED | OUTPATIENT
Start: 2021-07-28 | End: 2021-07-28

## 2021-07-28 RX ADMIN — IBUPROFEN 400 MG: 400 TABLET, FILM COATED ORAL at 03:23

## 2021-07-28 ASSESSMENT — PAIN DESCRIPTION - DESCRIPTORS: DESCRIPTORS: DISCOMFORT

## 2021-07-28 ASSESSMENT — PAIN SCALES - GENERAL: PAINLEVEL_OUTOF10: 6

## 2021-07-28 ASSESSMENT — PAIN DESCRIPTION - LOCATION: LOCATION: FINGER (COMMENT WHICH ONE)

## 2021-07-28 ASSESSMENT — PAIN DESCRIPTION - ORIENTATION: ORIENTATION: LEFT

## 2021-07-28 NOTE — ED PROVIDER NOTES
HPI:  7/28/21,   Time: 3:14 AM EDT       Sofia Perdomo is a 74 y.o. female presenting to the ED for left middle finger pain, beginning 3 months ago. The complaint has been persistent, mild in severity, and worsened by movement of finger and pushing on it. Bib private vehicle, hx same on other fingers, nothing makes better, seen pcp for same, told would go away on its own. No fever/chills/sweats/nv/d. Finger is red    Review of Systems:   Pertinent positives and negatives are stated within HPI, all other systems reviewed and are negative.          --------------------------------------------- PAST HISTORY ---------------------------------------------  Past Medical History:  has a past medical history of Chronic kidney disease, Diabetes mellitus ([address] Hearing loss, Hypothyroidism, Obesity, Osteoarthritis, Thoracic and lumbosacral neuritis, and Trochanteric bursitis. Past Surgical History:  has a past surgical history that includes Ectopic pregnancy surgery and cyst removal.    Social History:  reports that she quit smoking about 40 years ago. Her smoking use included cigarettes. She has a 2.50 pack-year smoking history. She has never used smokeless tobacco. She reports that she does not drink alcohol and does not use drugs. Family History: family history is not on file. The patients home medications have been reviewed. Allergies: [place] flavor and Penicillins        ---------------------------------------------------PHYSICAL EXAM--------------------------------------    Constitutional/General: Alert and oriented x3, well appearing, non toxic in NAD  Head: Normocephalic and atraumatic  Eyes: PERRL, EOMI,   Cardiovascular:   2+ distal pulses  Chest: No chest wall tenderness     Musculoskeletal: Moves all extremities x 4. Warm and well perfused, no clubbing, cyanosis, or edema.  Capillary refill <3 seconds,  Left 3rd digit with radial aspect nodule at dip joint, with skin thickening and irritation for cont use,    Integument: skin warm and dry. No rashes. Lymphatic: no lymphadenopathy noted  Neurologic: GCS 15, no focal deficits, symmetric strength 5/5 in the upper and lower extremities bilaterally  Psychiatric: Normal Affect    -------------------------------------------------- RESULTS -------------------------------------------------  I have personally reviewed all laboratory and imaging results for this patient. Results are listed below. LABS:  No results found for this visit on 07/28/21. RADIOLOGY:  Interpreted by Radiologist.  XR HAND LEFT (MIN 3 VIEWS)   Final Result   No acute bony abnormality. Degenerative changes. Soft tissue calcifications in the lateral 3rd phalanx. This is very   nonspecific and could be related to prior trauma or infection. Other   etiologies including metabolic process, autoimmune process, as well as   neoplasia thought much less likely. Correlate with clinical presentation. MRI would be useful if symptoms persist.             EKG:  This EKG is signed and interpreted by the EP. Time:   Rate:   Rhythm:   Interpretation:   Comparison:       ------------------------- NURSING NOTES AND VITALS REVIEWED ---------------------------   The nursing notes within the ED encounter and vital signs as below have been reviewed by myself. /84   Pulse 85   Temp 97 °F (36.1 °C) (Temporal)   Resp 16   Ht 5' 4\" (1.626 m)   Wt 240 lb (108.9 kg)   SpO2 97%   BMI 41.20 kg/m²   Oxygen Saturation Interpretation: Normal    The patients available past medical records and past encounters were reviewed. ------------------------------ ED COURSE/MEDICAL DECISION MAKING----------------------  Medications   ibuprofen (ADVIL;MOTRIN) tablet 400 mg (400 mg Oral Given 7/28/21 0323)         ED COURSE:       Medical Decision Making:    Cyst on finger, ?  Ganglion, xr shows calcification in same area, no fever, redness is from irritation of rubbing fingers,not infxn, dc with hand surgeon referal      This patient's ED course included: a personal history and physicial examination    This patient has remained hemodynamically stable during their ED course. Counseling: The emergency provider has spoken with the patient and discussed todays results, in addition to providing specific details for the plan of care and counseling regarding the diagnosis and prognosis. Questions are answered at this time and they are agreeable with the plan.       --------------------------------- IMPRESSION AND DISPOSITION ---------------------------------    IMPRESSION  1. Ganglion cyst of finger of left hand        DISPOSITION  Disposition: Discharge to home  Patient condition is stable    NOTE: This report was transcribed using voice recognition software.  Every effort was made to ensure accuracy; however, inadvertent computerized transcription errors may be present        Sherren Haff, MD  07/28/21 5033

## 2021-08-11 ENCOUNTER — OFFICE VISIT (OUTPATIENT)
Dept: PRIMARY CARE CLINIC | Age: 66
End: 2021-08-11
Payer: MEDICARE

## 2021-08-11 VITALS
SYSTOLIC BLOOD PRESSURE: 126 MMHG | TEMPERATURE: 97.6 F | RESPIRATION RATE: 16 BRPM | HEIGHT: 64 IN | BODY MASS INDEX: 42.2 KG/M2 | WEIGHT: 247.2 LBS | HEART RATE: 57 BPM | OXYGEN SATURATION: 97 % | DIASTOLIC BLOOD PRESSURE: 82 MMHG

## 2021-08-11 DIAGNOSIS — Z00.00 ROUTINE GENERAL MEDICAL EXAMINATION AT A HEALTH CARE FACILITY: Primary | ICD-10-CM

## 2021-08-11 DIAGNOSIS — E78.2 MIXED HYPERLIPIDEMIA: ICD-10-CM

## 2021-08-11 DIAGNOSIS — M17.0 PRIMARY OSTEOARTHRITIS OF BOTH KNEES: ICD-10-CM

## 2021-08-11 DIAGNOSIS — N18.2 TYPE 2 DIABETES MELLITUS WITH STAGE 2 CHRONIC KIDNEY DISEASE, WITHOUT LONG-TERM CURRENT USE OF INSULIN (HCC): ICD-10-CM

## 2021-08-11 DIAGNOSIS — E66.01 CLASS 3 SEVERE OBESITY DUE TO EXCESS CALORIES WITH SERIOUS COMORBIDITY AND BODY MASS INDEX (BMI) OF 40.0 TO 44.9 IN ADULT (HCC): ICD-10-CM

## 2021-08-11 DIAGNOSIS — I10 ESSENTIAL HYPERTENSION: ICD-10-CM

## 2021-08-11 DIAGNOSIS — R26.9 GAIT ABNORMALITY: ICD-10-CM

## 2021-08-11 DIAGNOSIS — E11.22 TYPE 2 DIABETES MELLITUS WITH STAGE 2 CHRONIC KIDNEY DISEASE, WITHOUT LONG-TERM CURRENT USE OF INSULIN (HCC): ICD-10-CM

## 2021-08-11 DIAGNOSIS — E11.9 TYPE 2 DIABETES MELLITUS WITHOUT COMPLICATION, WITHOUT LONG-TERM CURRENT USE OF INSULIN (HCC): Primary | ICD-10-CM

## 2021-08-11 PROCEDURE — G8417 CALC BMI ABV UP PARAM F/U: HCPCS | Performed by: INTERNAL MEDICINE

## 2021-08-11 PROCEDURE — 1036F TOBACCO NON-USER: CPT | Performed by: INTERNAL MEDICINE

## 2021-08-11 PROCEDURE — G0439 PPPS, SUBSEQ VISIT: HCPCS | Performed by: INTERNAL MEDICINE

## 2021-08-11 PROCEDURE — 1090F PRES/ABSN URINE INCON ASSESS: CPT | Performed by: INTERNAL MEDICINE

## 2021-08-11 PROCEDURE — 1123F ACP DISCUSS/DSCN MKR DOCD: CPT | Performed by: INTERNAL MEDICINE

## 2021-08-11 PROCEDURE — 2022F DILAT RTA XM EVC RTNOPTHY: CPT | Performed by: INTERNAL MEDICINE

## 2021-08-11 PROCEDURE — G8427 DOCREV CUR MEDS BY ELIG CLIN: HCPCS | Performed by: INTERNAL MEDICINE

## 2021-08-11 PROCEDURE — 3017F COLORECTAL CA SCREEN DOC REV: CPT | Performed by: INTERNAL MEDICINE

## 2021-08-11 PROCEDURE — 99214 OFFICE O/P EST MOD 30 MIN: CPT | Performed by: INTERNAL MEDICINE

## 2021-08-11 PROCEDURE — 4040F PNEUMOC VAC/ADMIN/RCVD: CPT | Performed by: INTERNAL MEDICINE

## 2021-08-11 PROCEDURE — G8400 PT W/DXA NO RESULTS DOC: HCPCS | Performed by: INTERNAL MEDICINE

## 2021-08-11 PROCEDURE — 3044F HG A1C LEVEL LT 7.0%: CPT | Performed by: INTERNAL MEDICINE

## 2021-08-11 RX ORDER — METOPROLOL TARTRATE 50 MG/1
TABLET, FILM COATED ORAL
Qty: 180 TABLET | Refills: 1 | Status: SHIPPED
Start: 2021-08-11 | End: 2022-02-10 | Stop reason: SDUPTHER

## 2021-08-11 ASSESSMENT — ENCOUNTER SYMPTOMS
DIARRHEA: 0
BACK PAIN: 0
EYE REDNESS: 0
COUGH: 0
RHINORRHEA: 0
ABDOMINAL PAIN: 0
CONSTIPATION: 0
EYE DISCHARGE: 0
CHEST TIGHTNESS: 0
SORE THROAT: 0
NAUSEA: 0
WHEEZING: 0

## 2021-08-11 ASSESSMENT — LIFESTYLE VARIABLES: HOW OFTEN DO YOU HAVE A DRINK CONTAINING ALCOHOL: 0

## 2021-08-11 ASSESSMENT — PATIENT HEALTH QUESTIONNAIRE - PHQ9
1. LITTLE INTEREST OR PLEASURE IN DOING THINGS: 0
SUM OF ALL RESPONSES TO PHQ QUESTIONS 1-9: 0
SUM OF ALL RESPONSES TO PHQ9 QUESTIONS 1 & 2: 0
SUM OF ALL RESPONSES TO PHQ QUESTIONS 1-9: 0
2. FEELING DOWN, DEPRESSED OR HOPELESS: 0
SUM OF ALL RESPONSES TO PHQ QUESTIONS 1-9: 0

## 2021-08-11 NOTE — PROGRESS NOTES
HPI:  Patient comes in today for for follow-up on diabetes she has developed a fracture in her little toe of the left foot and seen the podiatrist patient had complete diabetic foot exam that was June 9. Patient also had her diabetic eye exam in May 2021  Patient had a ganglion in the middle finger of her left hand and she states that in July it became larger and became very painful she decided to go to the emergency room and no surgery or intervention was done she was advised to continue soaking her hand and she was also advised to avoid calcium supplement and a high calcium diet patient states that this has improved and the size of the ganglion is much better and she does not have significant pain at this point. Review of Systems   Constitutional: Negative for appetite change, chills, fatigue and fever. HENT: Negative for ear pain, rhinorrhea and sore throat. Eyes: Negative for discharge, redness and visual disturbance. Respiratory: Negative for cough, chest tightness and wheezing. Cardiovascular: Negative for chest pain and leg swelling. Gastrointestinal: Negative for abdominal pain, constipation, diarrhea and nausea. Genitourinary: Negative for difficulty urinating, dysuria, frequency and urgency. Musculoskeletal: Negative for arthralgias, back pain and joint swelling. Neurological: Negative for dizziness, tremors and light-headedness. Hematological: Negative for adenopathy. Past Medical History:   Diagnosis Date    Chronic kidney disease     Diabetes mellitus ([address]     Hearing loss     Hypothyroidism     Obesity     Osteoarthritis     Thoracic and lumbosacral neuritis     Trochanteric bursitis      Past Surgical History:   Procedure Laterality Date    CYST REMOVAL      ECTOPIC PREGNANCY SURGERY       No family history on file.    Social History     Tobacco Use    Smoking status: Former Smoker     Packs/day: 0.25     Years: 10.00     Pack years: 2.50     Types: Cigarettes Quit date: 1981     Years since quittin.11    Smokeless tobacco: Never Used   Substance Use Topics    Alcohol use: Never    Drug use: Never        Current Outpatient Medications on File Prior to Visit   Medication Sig Dispense Refill    atorvastatin (LIPITOR) 40 MG tablet Take 1 tablet by mouth daily 30 tablet 3    metFORMIN (GLUCOPHAGE-XR) 750 MG extended release tablet TAKE 1 TABLET BY MOUTH WITH BREAKFAST AND 1 TABLET BY MOUTH WITH DINNER EVERY DAY      valsartan-hydroCHLOROthiazide (DIOVAN-HCT) 320-25 MG per tablet TAKE 1 TABLET BY MOUTH DAILY FOR HIGH BLOOD PRESSURE      levothyroxine (SYNTHROID) 100 MCG tablet Take 1 tablet by mouth daily 90 tablet 3     No current facility-administered medications on file prior to visit. PE:  VS:  /82   Pulse 57   Temp 97.6 °F (36.4 °C) (Temporal)   Resp 16   Ht 5' 4\" (1.626 m)   Wt 247 lb 3.2 oz (112.1 kg)   SpO2 97%   BMI 42.43 kg/m²   General:  Alert and oriented, NAD  HEENT:  TMs, EACs and auricles are normal, ALBERTO, EOMI, Conjunctivae clear       Throat currently clear. NECK:  Supple without adenopathy or thyromegaly, no carotid bruits. LUNGS:  CTA all fields  HEART:  RRR without M, R, or G  ABDOMEN:  Soft and nontender without palpable abnormalities, No renal or aortic bruits. EXTREMITIES:  Without clubbing, cyanosis, or edema, 2+ DP/PT pulses B    Lab Results   Component Value Date    WBC 8.0 2021    HGB 14.4 2021    HCT 42.6 2021     2021    CHOL 135 2021    TRIG 76 2021    HDL 49 2021    ALT 17 2021    AST 27 2021     2021    K 4.5 2021     2021    CREATININE 0.8 2021    BUN 21 2021    CO2 23 2021    TSH 1.450 2021    LABA1C 6.4 (H) 2021        Impression/Plan:    1.  Type 2 diabetes mellitus without complication, without long-term current use of insulin (HCC)  Assessment & Plan:  GFR more than 60, that is stage I and II kidney disease and patient is doing very well A1c is at goal 6.4. Patient is unable to lose weight states she likes the willpower but is hoping to try. Orders:  -     Semaglutide 7 MG TABS; Take 7 mg by mouth daily, Disp-30 tablet, R-5Print  -     Comprehensive Metabolic Panel; Future  -     Hemoglobin A1C; Future  -     Lipid Panel; Future  -     CBC Auto Differential; Future  -     Microalbumin / Creatinine Urine Ratio; Future  -     TSH without Reflex; Future  -     [facility]  2. Essential hypertension  Assessment & Plan:  Blood pressure excellent controlled at goal.   Orders:  -     metoprolol tartrate (LOPRESSOR) 50 MG tablet; TAKE 1 TABLET BY MOUTH TWICE DAILY, Disp-180 tablet, R-1Normal  -     Comprehensive Metabolic Panel; Future  -     Hemoglobin A1C; Future  -     Lipid Panel; Future  -     CBC Auto Differential; Future  -     Microalbumin / Creatinine Urine Ratio; Future  -     TSH without Reflex; Future  3. Type 2 diabetes mellitus with stage 2 chronic kidney disease, without long-term current use of insulin (HCC)  4. Class 3 severe obesity due to excess calories with serious comorbidity and body mass index (BMI) of 40.0 to 44.9 in adult [facility])  Assessment & Plan:  Patient can benefit from additional diabetes medication to help her with weight loss so she can achieve a better body mass index she is morbidly obese and at the same time she is in need for knee surgeries we have added Rybelsus patient educated on how to take the medication need to take it with to 3 sips of water in the morning on an empty stomach and avoid taking any food or drink for about a half an hour after. Samples of 3 mg to take 1 every morning before she takes her thyroid is given to the patient for a month and a prescription for the 7 mg is given to the patient she is also given information on drug assistance program to call the company and try to get this covered.    Orders:  - Comprehensive Metabolic Panel; Future  -     Hemoglobin A1C; Future  -     Lipid Panel; Future  -     CBC Auto Differential; Future  -     Microalbumin / Creatinine Urine Ratio; Future  -     TSH without Reflex; Future  -     [employer] - [facility]  5. Mixed hyperlipidemia  Assessment & Plan:  LDL at goal.  Patient to continue atorvastatin at 40 mg daily. Low-fat diet avoiding starches avoiding sweets desserts etc. all discussed patient is very knowledgeable states that she just needs more willpower. Orders:  -     Comprehensive Metabolic Panel; Future  -     Hemoglobin A1C; Future  -     Lipid Panel; Future  -     CBC Auto Differential; Future  -     Microalbumin / Creatinine Urine Ratio; Future  -     TSH without Reflex; Future  6. Primary osteoarthritis of both knees  Assessment & Plan:  Patient does not ambulate enough because of her arthritis in the knees she is in need for knee surgery and cannot proceed without the weight loss. Orders:  -     Comprehensive Metabolic Panel; Future  -     Hemoglobin A1C; Future  -     Lipid Panel; Future  -     CBC Auto Differential; Future  -     Microalbumin / Creatinine Urine Ratio; Future  -     TSH without Reflex; Future  -     Nikole Eaton MD, [facility]  7. Gait abnormality  Patient is in need for knee surgery she is not ambulatory enough and is not able to lose weight. We will consider referral to sports medicine for injections of the knees. Referral for Dr. Hernandez Masters initiated. ACP documentation discussed and completed today. Refer to note.

## 2021-08-11 NOTE — PATIENT INSTRUCTIONS
Personalized Preventive Plan for Carla Souza - 8/11/2021  Medicare offers a range of preventive health benefits. Some of the tests and screenings are paid in full while other may be subject to a deductible, co-insurance, and/or copay. Some of these benefits include a comprehensive review of your medical history including lifestyle, illnesses that may run in your family, and various assessments and screenings as appropriate. After reviewing your medical record and screening and assessments performed today your provider may have ordered immunizations, labs, imaging, and/or referrals for you. A list of these orders (if applicable) as well as your Preventive Care list are included within your After Visit Summary for your review. Other Preventive Recommendations:    · A preventive eye exam performed by an eye specialist is recommended every 1-2 years to screen for glaucoma; cataracts, macular degeneration, and other eye disorders. · A preventive dental visit is recommended every 6 months. · Try to get at least 150 minutes of exercise per week or 10,000 steps per day on a pedometer . · Order or download the FREE \"Exercise & Physical Activity: Your Everyday Guide\" from [employer]. Call 5-996.576.8972 or search [employer] online. · You need 0186-7832 mg of calcium and 2153-2224 IU of vitamin D per day. It is possible to meet your calcium requirement with diet alone, but a vitamin D supplement is usually necessary to meet this goal.  · When exposed to the sun, use a sunscreen that protects against both UVA and UVB radiation with an SPF of 30 or greater. Reapply every 2 to 3 hours or after sweating, drying off with a towel, or swimming. · Always wear a seat belt when traveling in a car. Always wear a helmet when riding a bicycle or motorcycle.

## 2021-08-11 NOTE — ASSESSMENT & PLAN NOTE
LDL at goal.  Patient to continue atorvastatin at 40 mg daily. Low-fat diet avoiding starches avoiding sweets desserts etc. all discussed patient is very knowledgeable states that she just needs more willpower.

## 2021-08-11 NOTE — ASSESSMENT & PLAN NOTE
GFR more than 60, that is stage I and II kidney disease and patient is doing very well A1c is at goal 6.4. Patient is unable to lose weight states she likes the willpower but is hoping to try.

## 2021-08-11 NOTE — PROGRESS NOTES
Medicare Annual Wellness Visit  Name: Ora Heading Date: 2021   MRN: [id number] Sex: Female   Age: 74 y.o. Ethnicity: Non- / Non    : 1955 Race: White (non-)      Leroy Tran is here for Medicare AWV    Screenings for behavioral, psychosocial and functional/safety risks, and cognitive dysfunction are all negative except as indicated below. These results, as well as other patient data from the [address] form, are documented in Flowsheets linked to this Encounter. Allergies   Allergen Reactions    [place] Flavor Hives    Penicillins Nausea Only and Swelling         Prior to Visit Medications    Medication Sig Taking? Authorizing Provider   metoprolol tartrate (LOPRESSOR) 50 MG tablet TAKE 1 TABLET BY MOUTH TWICE DAILY  Mariann Aguilar MD   Semaglutide 7 MG TABS Take 7 mg by mouth daily  Charli Shipley MD   atorvastatin (LIPITOR) 40 MG tablet Take 1 tablet by mouth daily  Charli Shipley MD   metFORMIN (GLUCOPHAGE-XR) 750 MG extended release tablet TAKE 1 TABLET BY MOUTH WITH BREAKFAST AND 1 TABLET BY MOUTH WITH DINNER EVERY DAY  Historical Provider, MD   valsartan-hydroCHLOROthiazide (DIOVAN-HCT) 320-25 MG per tablet TAKE 1 TABLET BY MOUTH DAILY FOR HIGH BLOOD PRESSURE  Historical Provider, MD   levothyroxine (SYNTHROID) 100 MCG tablet Take 1 tablet by mouth daily  Charli Shipley MD         Past Medical History:   Diagnosis Date    Chronic kidney disease     Diabetes mellitus ([facility]     Hearing loss     Hypothyroidism     Obesity     Osteoarthritis     Thoracic and lumbosacral neuritis     Trochanteric bursitis        Past Surgical History:   Procedure Laterality Date    CYST REMOVAL      ECTOPIC PREGNANCY SURGERY         No family history on file.     CareTeam (Including outside providers/suppliers regularly involved in providing care):   Patient Care Team:  Charli Shipley MD as PCP - General (Internal Medicine)  Charli Shipley MD as PCP [facility] Empaneled Provider    Wt Readings from Last 3 Encounters:   08/11/21 247 lb 3.2 oz (112.1 kg)   07/28/21 240 lb (108.9 kg)   05/06/21 245 lb (111.1 kg)     There were no vitals filed for this visit. There is no height or weight on file to calculate BMI. Based upon direct observation of the patient, evaluation of cognition reveals recent and remote memory intact. General Appearance: alert and oriented to person, place and time, well developed and well- nourished, in no acute distress  Skin: warm and dry, no rash or erythema  Head: normocephalic and atraumatic  Neck: supple and non-tender without mass, no thyromegaly or thyroid nodules, no cervical lymphadenopathy  Pulmonary/Chest: clear to auscultation bilaterally- no wheezes, rales or rhonchi, normal air movement, no respiratory distress  Cardiovascular: normal rate, regular rhythm, normal S1 and S2, no murmurs, rubs, clicks, or gallops, distal pulses intact, no carotid bruits  Abdomen: soft, non-tender, non-distended, normal bowel sounds, no masses or organomegaly  Extremities: no cyanosis, clubbing or edema  Musculoskeletal: normal range of motion, no joint swelling, deformity or tenderness    Patient's complete Health Risk Assessment and screening values have been reviewed and are found in Flowsheets. The following problems were reviewed today and where indicated follow up appointments were made and/or referrals ordered. Positive Risk Factor Screenings with Interventions:            General Health and ACP:  General  In general, how would you say your health is?: Fair  In the past 7 days, have you experienced any of the following?  New or Increased Pain, New or Increased Fatigue, Loneliness, Social Isolation, Stress or Anger?: None of These  Do you get the social and emotional support that you need?: Yes  Do you have a Living Will?: (!) No  Advance Directives     Power of [address] [place] [address] Will ACP-Advance Directive ACP-Power of     Not on File Not on File Not on File Not on File      General Health Risk Interventions:  · No Living Will: Patient chooses to be full code and she assigns her  to be her ACP, primary decision maker name Ellen Le birthdate 1/10/1963    Health Habits/Nutrition:  Health Habits/Nutrition  Do you exercise for at least 20 minutes 2-3 times per week?: Yes  Have you lost any weight without trying in the past 3 months?: No  Do you eat only one meal per day?: No  Have you seen the dentist within the past year?: Yes     Health Habits/Nutrition Interventions:  · Patient states she does exercises for half an hour every day, she need to add to that increasing walking to help her bones and to help her lose the weight advised to walk 30 minutes 5 days a week for a total of 150 minutes/week      ADL:  ADLs  In the past 7 days, did you need help from others to perform any of the following everyday activities? Eating, dressing, grooming, bathing, toileting, or walking/balance?: None  In the past 7 days, did you need help from others to take care of any of the following? Laundry, housekeeping, banking/finances, shopping, telephone use, food preparation, transportation, or taking medications?: (!) Shopping  ADL Interventions:  · Patient very independent with activities of daily living except she does not like to go to large grocery stores her  takes care of doing the groceries.     Personalized Preventive Plan   Current Health Maintenance Status  Immunization History   Administered Date(s) Administered    COVID-19, Moderna, PF, 100mcg/0.5mL 03/12/2021, 04/12/2021    Pneumococcal Conjugate 13-valent (Josh Cords) 12/06/2018        Health Maintenance   Topic Date Due    Hepatitis C screen  Never done    Diabetic foot exam  Never done    DTaP/Tdap/Td vaccine (1 - Tdap) Never done    DEXA (modify frequency per FRAX score)  Never done    Pneumococcal 65+ years Vaccine (2 of 2 - PPSV23) 12/06/2019    Annual Wellness Visit (AWV) Never done    Flu vaccine (1) 09/01/2021    Breast cancer screen  11/22/2021    Colon cancer screen fecal DNA test (Cologuard)  05/05/2022    A1C test (Diabetic or Prediabetic)  08/05/2022    Lipid screen  08/05/2022    TSH testing  08/05/2022    Potassium monitoring  08/05/2022    Creatinine monitoring  08/05/2022    Diabetic retinal exam  05/13/2023    Shingles Vaccine  Completed    COVID-19 Vaccine  Completed    Hepatitis A vaccine  Aged Out    Hib vaccine  Aged Out    Meningococcal (ACWY) vaccine  Aged Out     Recommendations for [place] Due: see orders and patient instructions/AVS.  . Recommended screening schedule for the next 5-10 years is provided to the patient in written form: see Patient Instructions/AVS.    Mavis Mo was seen today for medicare aw.     Diagnoses and all orders for this visit:    Routine general medical examination at a health care facility

## 2021-08-11 NOTE — ASSESSMENT & PLAN NOTE
Patient can benefit from additional diabetes medication to help her with weight loss so she can achieve a better body mass index she is morbidly obese and at the same time she is in need for knee surgeries we have added Rybelsus patient educated on how to take the medication need to take it with to 3 sips of water in the morning on an empty stomach and avoid taking any food or drink for about a half an hour after. Samples of 3 mg to take 1 every morning before she takes her thyroid is given to the patient for a month and a prescription for the 7 mg is given to the patient she is also given information on drug assistance program to call the company and try to get this covered.

## 2021-08-11 NOTE — ASSESSMENT & PLAN NOTE
Patient does not ambulate enough because of her arthritis in the knees she is in need for knee surgery and cannot proceed without the weight loss.

## 2021-08-25 ENCOUNTER — HOSPITAL ENCOUNTER (OUTPATIENT)
Dept: DIABETES SERVICES | Age: 66
Setting detail: THERAPIES SERIES
Discharge: HOME | End: 2021-08-25
Payer: MEDICARE

## 2021-08-25 PROCEDURE — G0109 DIAB MANAGE TRN IND/GROUP: HCPCS

## 2021-08-25 ASSESSMENT — PROBLEM AREAS IN DIABETES QUESTIONNAIRE (PAID)
COPING WITH COMPLICATIONS OF DIABETES: 2
FEELING THAT DIABETES IS TAKING UP TOO MUCH OF YOUR MENTAL AND PHYSICAL ENERGY EVERY DAY: 2
PAID-5 TOTAL SCORE: 10
FEELING SCARED WHEN YOU THINK ABOUT LIVING WITH DIABETES: 2
WORRYING ABOUT THE FUTURE AND THE POSSIBILITY OF SERIOUS COMPLICATIONS: 2
FEELING DEPRESSED WHEN YOU THINK ABOUT LIVING WITH DIABETES: 2

## 2021-08-25 NOTE — LETTER
[facility])- Diabetes Education Department Initial Diabetes Education Letter    2021       Re:     Chip Henry         :  1955  Dear Dr. Kofi Zayas:                    Thank you for referring your patient, Chip Henry, for diabetes education. Chip Henry has completed their comprehensive education plan today which included the topics below:    Nursing/Medical [x]      Nutrition [x]  [] Diabetes disease process & treatment   [] Diabetes medication use and safety   [] Self-monitoring blood glucose/interpreting results  [] Prevention, detection and treatment of acute complications  [] Prevention, detection and treatment of chronic complications  [] Developing strategies to address psychosocial issues    [] Nutritional management: basic principles   [] Carbohydrate counting, plate method, label reading  [] Benefits of/ways to incorporate physical activity  [] Problem solving and preparing for crisis situations  [] Diabetes supply management  [] Goal setting and behavior modification         PAID -5 (Problem Areas in Diabetes) Survey Results  10  A total score of 8 or greater indicates possible diabetes related emotional distress which warrants further assessment. [x] [address] and Crisis Resource information provided. Comments:     PATIENT SELECTED GOAL:   [x]  I will follow my meal plan and measure my carbohydrate foods. []  I will increase my activity to:  []  I will check my blood glucose as ordered by my doctor. []  I will take my medications at the correct times as ordered by my doctor.   []  Other:      DIABETES SELF-MANAGEMENT SUPPORT PLAN/REFERRALS (patient identified):  [x] Keep my scheduled visits with my doctor   [] Make and keep appointments with specialists (foot, eye, dentist) as recommended  [] Consult my pharmacist with all new medications and/or any medication questions  [] Get tested for sleep apnea  [] Seek help for:   [] Make an appointment with:   [] Attend smoking cessation classes or call help-line (8-368-QUIT-NOW; [phone])  [] Attend a diabetes support group  [] Use diabetes magazines, books, or the American Diabetes Association website (www.diabetes. org) for more information    [] Start or increase exercising at home or join a program:  [] Other: There will be a follow-up in 3 months to evaluate the attainment of their chosen goal, and self identified support plan and you will be notified of their progress. Thank you for referring this patient to our program.  Please do not hesitate to call if you have any questions at [phone] [facility] or [facility]) or [phone] ([address] [place] [address]).         Sincerely,    Drew Parker RN, BSN, [address] [facility] [place] Diabetes Education Department  American Diabetes Association Recognized DSMES Program

## 2021-08-26 ENCOUNTER — HOSPITAL ENCOUNTER (OUTPATIENT)
Dept: DIABETES SERVICES | Age: 66
Setting detail: THERAPIES SERIES
Discharge: HOME | End: 2021-08-26
Payer: MEDICARE

## 2021-08-26 PROCEDURE — G0109 DIAB MANAGE TRN IND/GROUP: HCPCS

## 2021-08-26 NOTE — PROGRESS NOTES
All     []            []  4    Prevention, detection & treatment of acute complications:    -State the causes,signs & symptoms of hyper & hypoglycemia, and prevention & treatment strategies.   -Describe sick day guidelines  DKA /indications for ketone testing &  when to call physician  2 [x] All     []      []    4              -Identify severe weather/situation crisis  & diabetes supplies management  []      Using medications safely:   -State effects of diabetes medicines on blood glucose levels;  -List diabetes medication taken, action & side effects 2 [x] All     []  []  4 8/25/21 Prescribed Rybelsus, but hesitant to take d/t risk for thyroid CA. Encouraged further discussion with her doctor, including other possible options that she may be more comfortable taking, such as insulin. Insulin/Injectables/glucagon  -Name appropriate injection sites; proper storage; supplies needed;     []       Demonstrate proper technique  []      Monitoring blood glucose, interpreting and using results:   -Identify the purpose of testing   -Identify recommended & personal blood glucose targets & HgbA1C target levels  -State the Importance of logging blood glucose levels for pattern recognition;   -State benefits of reading/using pt generated health data  -Verbalize safe lancet disposal 2 [x] All     []  []    []  []  []  4 8/25/21   Monitors at home    -Demonstrate proper testing technique  []      Incorporating physical activity into lifestyle:   -State effect of exercise on blood glucose levels;   -State benefits of regular exercise;   -Define safety considerations/food choices if needed.  -Describe contraindications/maintenance of activity.  2 [x] All     []  []    []  []  4    Incorporating nutritional management into lifestyle:   -Describe effect of type, amount & timing of food on blood glucose  -Describe methods for preparing and planning healthy meals  -Correctly read food labels  -Name 3 foods high in Carbohydrate 2 [] Attend smoking cessation classes or call 1-800-QUIT-NOW  [] Attend Diabetes Support Group   [] Use diabetes magazines, books, or credible web-sites like the ADA for more information  [] Increase exercise at home or join an exercise program:   [] Other:

## 2021-08-27 ENCOUNTER — TELEPHONE (OUTPATIENT)
Dept: PRIMARY CARE CLINIC | Age: 66
End: 2021-08-27

## 2021-08-27 RX ORDER — VALSARTAN AND HYDROCHLOROTHIAZIDE 320; 25 MG/1; MG/1
TABLET, FILM COATED ORAL
Qty: 90 TABLET | Refills: 0 | Status: SHIPPED
Start: 2021-08-27 | End: 2021-08-30 | Stop reason: SDUPTHER

## 2021-08-27 RX ORDER — VALSARTAN AND HYDROCHLOROTHIAZIDE 320; 25 MG/1; MG/1
TABLET, FILM COATED ORAL
Qty: 30 TABLET | Refills: 0 | Status: SHIPPED
Start: 2021-08-27 | End: 2021-08-27

## 2021-08-27 NOTE — TELEPHONE ENCOUNTER
----- Message from Ashli Shaunna sent at 8/26/2021  1:40 PM EDT -----  Subject: Refill Request    QUESTIONS  Name of Medication? valsartan-hydroCHLOROthiazide (DIOVAN-HCT) 320-25 MG   per tablet  Patient-reported dosage and instructions? 320-25mg  How many days do you have left? 2  Pref[facility]? [id number]  Pharmacy phone number (if available)? [phone]  ---------------------------------------------------------------------------  --------------,  Name of Medication? metFORMIN (GLUCOPHAGE-XR) 750 MG extended release   tablet  Patient-reported dosage and instructions? 750mg  How many days do you have left? 2  [facility]erred [facility]? [id number]  Pharmacy phone number (if available)? [phone]  ---------------------------------------------------------------------------  --------------  CALL BACK INFO  What is the best way for the office to contact you? OK to leave message on   voicemail  Preferred Call Back Phone Number?  [phone]

## 2021-08-27 NOTE — PROGRESS NOTES
Diabetes Self-Management Education Record    Participant Name: Mariangel Fontenot  Referring Provider: Nasreen Vital MD  Assessment/Evaluation Ratings:  1=Needs Instruction   4=Demonstrates Understanding/Competency  2=Needs Review   NC=Not Covered    3=Comprehends Key Points  N/A=Not Applicable  Topics/Learning Objectives Pre-session Assess Date:  Instructor initials/date  8/25/21 KMS-Patient refused  Instr. Date    Instructor initials/date  8/25/21 KMS    Follow-up Post- session Eval Comments   Diabetes disease process & Treatment process:   -Define type of diabetes in simple terms.  - Describe the ABCs of  diabetes management  -Identify own type of diabetes  -Identify lifestyle changes/treatment options  -other:  2 [x] All     []  []  []  []  []  4 Type 2 DM for many years  A1C 6.4%    Developing strategies for Healthy coping/psychosocial issues:    -Describe feelings about living with diabetes  -Identify coping strategies and sources of stress  -Identify support needed & support network available  -Complete PAID-5 Diabetes questionnaire 2 [x] All     []  []  []    []  4 Pooja Sandhu Magnus completed a Diabetes Self- Management Education Assessment on 8/25/21. Part of our assessment is having the patient complete the PAID (Problem Areas in Diabetes Scale)-5 survey. This tool  measures diabetes-related emotional distress a patient may be feeling. Mariangel Fontenot scored 10   A total score of >8 indicates possible diabetes related emotional distress, which warrants further assessment     PCP notified via EMR. Prevention, detection & treatment of Chronic complications:    -Identify the prevention, detection and treatment for complications including immunizations, preventive eye, foot, dental and renal exams as indicated per the participant's duration of diabetes and health status.  -Define the natural course of diabetes and the relationship of blood glucose levels to long term complications of diabetes.   2 [x] All     []            []  4    Prevention, detection & treatment of acute complications:    -State the causes,signs & symptoms of hyper & hypoglycemia, and prevention & treatment strategies.   -Describe sick day guidelines  DKA /indications for ketone testing &  when to call physician  2 [x] All     []      []    4              -Identify severe weather/situation crisis  & diabetes supplies management  []      Using medications safely:   -State effects of diabetes medicines on blood glucose levels;  -List diabetes medication taken, action & side effects 2 [x] All     []  []  4 8/25/21 Prescribed Rybelsus, but hesitant to take d/t risk for thyroid CA. Encouraged further discussion with her doctor, including other possible options that she may be more comfortable taking, such as insulin. Insulin/Injectables/glucagon  -Name appropriate injection sites; proper storage; supplies needed;     []       Demonstrate proper technique  []      Monitoring blood glucose, interpreting and using results:   -Identify the purpose of testing   -Identify recommended & personal blood glucose targets & HgbA1C target levels  -State the Importance of logging blood glucose levels for pattern recognition;   -State benefits of reading/using pt generated health data  -Verbalize safe lancet disposal 2 [x] All     []  []    []  []  []  4 8/25/21   Monitors at home    -Demonstrate proper testing technique  []      Incorporating physical activity into lifestyle:   -State effect of exercise on blood glucose levels;   -State benefits of regular exercise;   -Define safety considerations/food choices if needed.  -Describe contraindications/maintenance of activity.  2 [x] All     []  []    []  []  4    Incorporating nutritional management into lifestyle:   -Describe effect of type, amount & timing of food on blood glucose  -Describe methods for preparing and planning healthy meals  -Correctly read food labels  -Name 3 foods high in Carbohydrate 2 [x] appointments with specialists (foot, eye, dentist) as recommended   [] Consult my pharmacist about all new medications or to ask any medication questions   [] Get tested for sleep apnea   [] Seek help for:   [] Make an appointment with:   [] Attend smoking cessation classes or call 1-800-QUIT-NOW  [] Attend Diabetes Support Group   [] Use diabetes magazines, books, or credible web-sites like the ADA for more information  [] Increase exercise at home or join an exercise program:   [] Other:

## 2021-08-30 RX ORDER — METFORMIN HYDROCHLORIDE 750 MG/1
TABLET, EXTENDED RELEASE ORAL
Qty: 180 TABLET | Refills: 1 | Status: SHIPPED
Start: 2021-08-30 | End: 2022-03-08 | Stop reason: SDUPTHER

## 2021-11-04 DIAGNOSIS — E11.9 TYPE 2 DIABETES MELLITUS WITHOUT COMPLICATION, WITHOUT LONG-TERM CURRENT USE OF INSULIN (HCC): ICD-10-CM

## 2021-11-04 DIAGNOSIS — E78.2 MIXED HYPERLIPIDEMIA: ICD-10-CM

## 2021-11-04 DIAGNOSIS — I10 ESSENTIAL HYPERTENSION: ICD-10-CM

## 2021-11-04 DIAGNOSIS — E66.01 CLASS 3 SEVERE OBESITY DUE TO EXCESS CALORIES WITH SERIOUS COMORBIDITY AND BODY MASS INDEX (BMI) OF 40.0 TO 44.9 IN ADULT (HCC): ICD-10-CM

## 2021-11-04 DIAGNOSIS — M17.0 PRIMARY OSTEOARTHRITIS OF BOTH KNEES: ICD-10-CM

## 2021-11-04 LAB
ALBUMIN SERPL-MCNC: 4.1 G/DL (ref 3.5–5.2)
ALP BLD-CCNC: 95 U/L (ref 35–104)
ALT SERPL-CCNC: 18 U/L (ref 0–32)
ANION GAP SERPL CALCULATED.3IONS-SCNC: 14 MMOL/L (ref 7–16)
AST SERPL-CCNC: 27 U/L (ref 0–31)
BASOPHILS ABSOLUTE: 0.02 E9/L (ref 0–0.2)
BASOPHILS RELATIVE PERCENT: 0.3 % (ref 0–2)
BILIRUB SERPL-MCNC: 0.7 MG/DL (ref 0–1.2)
BUN BLDV-MCNC: 15 MG/DL (ref 6–23)
CALCIUM SERPL-MCNC: 9.7 MG/DL (ref 8.6–10.2)
CHLORIDE BLD-SCNC: 100 MMOL/L (ref 98–107)
CHOLESTEROL, TOTAL: 167 MG/DL (ref 0–199)
CO2: 23 MMOL/L (ref 22–29)
CREAT SERPL-MCNC: 0.8 MG/DL (ref 0.5–1)
CREATININE URINE: 141 MG/DL (ref 29–226)
EOSINOPHILS ABSOLUTE: 0.07 E9/L (ref 0.05–0.5)
EOSINOPHILS RELATIVE PERCENT: 1 % (ref 0–6)
GFR AFRICAN AMERICAN: >60
GFR NON-AFRICAN AMERICAN: >60 ML/MIN/1.73
GLUCOSE BLD-MCNC: 138 MG/DL (ref 74–99)
HBA1C MFR BLD: 6.6 % (ref 4–5.6)
HCT VFR BLD CALC: 42.5 % (ref 34–48)
HDLC SERPL-MCNC: 56 MG/DL
HEMOGLOBIN: 14.5 G/DL (ref 11.5–15.5)
IMMATURE GRANULOCYTES #: 0.03 E9/L
IMMATURE GRANULOCYTES %: 0.4 % (ref 0–5)
LDL CHOLESTEROL CALCULATED: 93 MG/DL (ref 0–99)
LYMPHOCYTES ABSOLUTE: 2.28 E9/L (ref 1.5–4)
LYMPHOCYTES RELATIVE PERCENT: 32 % (ref 20–42)
MCH RBC QN AUTO: 32.2 PG (ref 26–35)
MCHC RBC AUTO-ENTMCNC: 34.1 % (ref 32–34.5)
MCV RBC AUTO: 94.2 FL (ref 80–99.9)
MICROALBUMIN UR-MCNC: <12 MG/L
MICROALBUMIN/CREAT UR-RTO: ABNORMAL (ref 0–30)
MONOCYTES ABSOLUTE: 0.83 E9/L (ref 0.1–0.95)
MONOCYTES RELATIVE PERCENT: 11.7 % (ref 2–12)
NEUTROPHILS ABSOLUTE: 3.89 E9/L (ref 1.8–7.3)
NEUTROPHILS RELATIVE PERCENT: 54.6 % (ref 43–80)
PDW BLD-RTO: 13 FL (ref 11.5–15)
PLATELET # BLD: 245 E9/L (ref 130–450)
PMV BLD AUTO: 9.9 FL (ref 7–12)
POTASSIUM SERPL-SCNC: 3.9 MMOL/L (ref 3.5–5)
RBC # BLD: 4.51 E12/L (ref 3.5–5.5)
SODIUM BLD-SCNC: 137 MMOL/L (ref 132–146)
TOTAL PROTEIN: 6.8 G/DL (ref 6.4–8.3)
TRIGL SERPL-MCNC: 89 MG/DL (ref 0–149)
TSH SERPL DL<=0.05 MIU/L-ACNC: 1.64 UIU/ML (ref 0.27–4.2)
VLDLC SERPL CALC-MCNC: 18 MG/DL
WBC # BLD: 7.1 E9/L (ref 4.5–11.5)

## 2021-11-04 NOTE — TELEPHONE ENCOUNTER
Last Appointment:  8/11/2021  Future Appointments   Date Time Provider Jose Hanna   11/11/2021 11:00 AM MD Everett Velazco 55

## 2021-11-05 RX ORDER — ATORVASTATIN CALCIUM 40 MG/1
40 TABLET, FILM COATED ORAL DAILY
Qty: 90 TABLET | Refills: 3 | Status: SHIPPED
Start: 2021-11-05 | End: 2022-10-20 | Stop reason: SDUPTHER

## 2021-11-22 ENCOUNTER — OFFICE VISIT (OUTPATIENT)
Dept: PRIMARY CARE CLINIC | Age: 66
End: 2021-11-22
Payer: MEDICARE

## 2021-11-22 VITALS
SYSTOLIC BLOOD PRESSURE: 140 MMHG | OXYGEN SATURATION: 99 % | BODY MASS INDEX: 43.19 KG/M2 | RESPIRATION RATE: 18 BRPM | TEMPERATURE: 97.5 F | HEART RATE: 65 BPM | DIASTOLIC BLOOD PRESSURE: 70 MMHG | WEIGHT: 253 LBS | HEIGHT: 64 IN

## 2021-11-22 DIAGNOSIS — F41.8 MIXED ANXIETY DEPRESSIVE DISORDER: ICD-10-CM

## 2021-11-22 DIAGNOSIS — E11.22 TYPE 2 DIABETES MELLITUS WITH STAGE 2 CHRONIC KIDNEY DISEASE, WITHOUT LONG-TERM CURRENT USE OF INSULIN (HCC): Primary | ICD-10-CM

## 2021-11-22 DIAGNOSIS — E78.2 MIXED HYPERLIPIDEMIA: ICD-10-CM

## 2021-11-22 DIAGNOSIS — M25.561 CHRONIC PAIN OF BOTH KNEES: ICD-10-CM

## 2021-11-22 DIAGNOSIS — E66.01 CLASS 3 SEVERE OBESITY DUE TO EXCESS CALORIES WITH SERIOUS COMORBIDITY AND BODY MASS INDEX (BMI) OF 40.0 TO 44.9 IN ADULT (HCC): ICD-10-CM

## 2021-11-22 DIAGNOSIS — I10 ESSENTIAL HYPERTENSION: ICD-10-CM

## 2021-11-22 DIAGNOSIS — G89.29 CHRONIC PAIN OF BOTH KNEES: ICD-10-CM

## 2021-11-22 DIAGNOSIS — E03.9 HYPOTHYROIDISM, UNSPECIFIED TYPE: ICD-10-CM

## 2021-11-22 DIAGNOSIS — M25.562 CHRONIC PAIN OF BOTH KNEES: ICD-10-CM

## 2021-11-22 DIAGNOSIS — N18.2 TYPE 2 DIABETES MELLITUS WITH STAGE 2 CHRONIC KIDNEY DISEASE, WITHOUT LONG-TERM CURRENT USE OF INSULIN (HCC): Primary | ICD-10-CM

## 2021-11-22 PROCEDURE — G8417 CALC BMI ABV UP PARAM F/U: HCPCS | Performed by: INTERNAL MEDICINE

## 2021-11-22 PROCEDURE — 2022F DILAT RTA XM EVC RTNOPTHY: CPT | Performed by: INTERNAL MEDICINE

## 2021-11-22 PROCEDURE — 1036F TOBACCO NON-USER: CPT | Performed by: INTERNAL MEDICINE

## 2021-11-22 PROCEDURE — G8400 PT W/DXA NO RESULTS DOC: HCPCS | Performed by: INTERNAL MEDICINE

## 2021-11-22 PROCEDURE — G8427 DOCREV CUR MEDS BY ELIG CLIN: HCPCS | Performed by: INTERNAL MEDICINE

## 2021-11-22 PROCEDURE — 99214 OFFICE O/P EST MOD 30 MIN: CPT | Performed by: INTERNAL MEDICINE

## 2021-11-22 PROCEDURE — 1090F PRES/ABSN URINE INCON ASSESS: CPT | Performed by: INTERNAL MEDICINE

## 2021-11-22 PROCEDURE — 3017F COLORECTAL CA SCREEN DOC REV: CPT | Performed by: INTERNAL MEDICINE

## 2021-11-22 PROCEDURE — 1123F ACP DISCUSS/DSCN MKR DOCD: CPT | Performed by: INTERNAL MEDICINE

## 2021-11-22 PROCEDURE — 4040F PNEUMOC VAC/ADMIN/RCVD: CPT | Performed by: INTERNAL MEDICINE

## 2021-11-22 PROCEDURE — G8484 FLU IMMUNIZE NO ADMIN: HCPCS | Performed by: INTERNAL MEDICINE

## 2021-11-22 PROCEDURE — 3044F HG A1C LEVEL LT 7.0%: CPT | Performed by: INTERNAL MEDICINE

## 2021-11-22 RX ORDER — VALSARTAN AND HYDROCHLOROTHIAZIDE 320; 25 MG/1; MG/1
TABLET, FILM COATED ORAL
Qty: 90 TABLET | Refills: 3 | Status: SHIPPED | OUTPATIENT
Start: 2021-11-22

## 2021-11-22 RX ORDER — BUPROPION HYDROCHLORIDE 150 MG/1
150 TABLET, EXTENDED RELEASE ORAL EVERY MORNING
Qty: 30 TABLET | Refills: 1 | Status: SHIPPED
Start: 2021-11-22 | End: 2021-12-08

## 2021-11-22 ASSESSMENT — ENCOUNTER SYMPTOMS
VOICE CHANGE: 0
CHEST TIGHTNESS: 0
ABDOMINAL PAIN: 0
SORE THROAT: 0
NAUSEA: 0
WHEEZING: 0
SHORTNESS OF BREATH: 0
VOMITING: 0

## 2021-11-22 NOTE — PROGRESS NOTES
HPI:  Patient comes in today for complaint of difficulty walking patient has knee pain bilaterally she has bone-on-bone knees last x-rays was about 4 years ago. Patient is very unhappy that she is unable to lose weight she also states that she is very stressed out there are family issues that brought the patient to the area to help her family and currently she feels unhappy living in [facility]. She is agreeing that she has some anxiety and some depression because of how unhappy she is with her weight and with her living situation and that she stress eats. Patient have been declined from surgery on the knees before because of the weight. She never had injections in her knee before. Review of Systems   Constitutional: Negative for chills, fever and unexpected weight change. HENT: Negative for postnasal drip, sore throat and voice change. Respiratory: Negative for chest tightness, shortness of breath and wheezing. Cardiovascular: Negative for chest pain and leg swelling. Gastrointestinal: Negative for abdominal pain, nausea and vomiting. Musculoskeletal: Positive for arthralgias (As per HPI) and gait problem (As per HPI). Negative for joint swelling. Skin: Negative for rash and wound. Psychiatric/Behavioral:        Stress anxiety and depression. Past Medical History:   Diagnosis Date    Chronic kidney disease     Diabetes mellitus ([facility]     Hearing loss     Hypothyroidism     Obesity     Osteoarthritis     Thoracic and lumbosacral neuritis     Trochanteric bursitis      Past Surgical History:   Procedure Laterality Date    CYST REMOVAL      ECTOPIC PREGNANCY SURGERY       No family history on file.    Social History     Tobacco Use    Smoking status: Former Smoker     Packs/day: 0.25     Years: 10.00     Pack years: 2.50     Types: Cigarettes     Quit date: 1981     Years since quittin.9    Smokeless tobacco: Never Used   Substance Use Topics    Alcohol use: Never    90 days supply**Normal  -     Comprehensive Metabolic Panel; Future  -     Hemoglobin A1C; Future  -     Lipid Panel; Future  -     CBC Auto Differential; Future  -     Microalbumin / Creatinine Urine Ratio; Future  -     TSH without Reflex; Future  3. Class 3 severe obesity due to excess calories with serious comorbidity and body mass index (BMI) of 40.0 to 44.9 in adult [facility])  -     Comprehensive Metabolic Panel; Future  -     Hemoglobin A1C; Future  -     Lipid Panel; Future  -     CBC Auto Differential; Future  -     Microalbumin / Creatinine Urine Ratio; Future  -     TSH without Reflex; Future  4. Mixed hyperlipidemia  -     Comprehensive Metabolic Panel; Future  -     Hemoglobin A1C; Future  -     Lipid Panel; Future  -     CBC Auto Differential; Future  -     Microalbumin / Creatinine Urine Ratio; Future  -     TSH without Reflex; Future  5. Hypothyroidism, unspecified type  -     Comprehensive Metabolic Panel; Future  -     Hemoglobin A1C; Future  -     Lipid Panel; Future  -     CBC Auto Differential; Future  -     Microalbumin / Creatinine Urine Ratio; Future  -     TSH without Reflex; Future  6. Mixed anxiety depressive disorder  -     External Referral To Psychiatry  -     buPROPion (WELLBUTRIN SR) 150 MG extended release tablet; Take 1 tablet by mouth every morning, Disp-30 tablet, R-1Normal  7. Chronic pain of both knees  -     XR KNEE RIGHT (3 VIEWS); Future  -     XR KNEE LEFT (3 VIEWS); Future  -     External Referral To Physical Therapy  Patient has books about eating for diabetes she is encouraged to start to read one of the books and to learn more about healthy eating. Patient also encouraged to look for another place for residents and she has been looking at moving to [place]. and will continue to work on that.   Patient agreeable to see a psychiatrist for counseling also she is agreeable to try Wellbutrin which should help her with the craving for the food with her stress level and motivate her to do better for her diet.

## 2021-11-23 ENCOUNTER — TELEPHONE (OUTPATIENT)
Dept: PRIMARY CARE CLINIC | Age: 66
End: 2021-11-23

## 2021-11-23 DIAGNOSIS — M25.562 CHRONIC PAIN OF BOTH KNEES: Primary | ICD-10-CM

## 2021-11-23 DIAGNOSIS — G89.29 CHRONIC PAIN OF BOTH KNEES: Primary | ICD-10-CM

## 2021-11-23 DIAGNOSIS — M25.561 CHRONIC PAIN OF BOTH KNEES: Primary | ICD-10-CM

## 2021-12-08 ENCOUNTER — OFFICE VISIT (OUTPATIENT)
Dept: PRIMARY CARE CLINIC | Age: 66
End: 2021-12-08
Payer: MEDICARE

## 2021-12-08 VITALS
TEMPERATURE: 97.2 F | HEIGHT: 64 IN | RESPIRATION RATE: 18 BRPM | SYSTOLIC BLOOD PRESSURE: 110 MMHG | HEART RATE: 63 BPM | BODY MASS INDEX: 42.17 KG/M2 | WEIGHT: 247 LBS | OXYGEN SATURATION: 96 % | DIASTOLIC BLOOD PRESSURE: 60 MMHG

## 2021-12-08 DIAGNOSIS — E66.01 CLASS 3 SEVERE OBESITY DUE TO EXCESS CALORIES WITH SERIOUS COMORBIDITY AND BODY MASS INDEX (BMI) OF 40.0 TO 44.9 IN ADULT (HCC): ICD-10-CM

## 2021-12-08 DIAGNOSIS — E11.22 TYPE 2 DIABETES MELLITUS WITH STAGE 2 CHRONIC KIDNEY DISEASE, WITHOUT LONG-TERM CURRENT USE OF INSULIN (HCC): Primary | ICD-10-CM

## 2021-12-08 DIAGNOSIS — E03.9 HYPOTHYROIDISM, UNSPECIFIED TYPE: ICD-10-CM

## 2021-12-08 DIAGNOSIS — M25.562 CHRONIC PAIN OF BOTH KNEES: ICD-10-CM

## 2021-12-08 DIAGNOSIS — N18.2 TYPE 2 DIABETES MELLITUS WITH STAGE 2 CHRONIC KIDNEY DISEASE, WITHOUT LONG-TERM CURRENT USE OF INSULIN (HCC): Primary | ICD-10-CM

## 2021-12-08 DIAGNOSIS — M25.561 CHRONIC PAIN OF BOTH KNEES: ICD-10-CM

## 2021-12-08 DIAGNOSIS — E78.2 MIXED HYPERLIPIDEMIA: ICD-10-CM

## 2021-12-08 DIAGNOSIS — I10 ESSENTIAL HYPERTENSION: ICD-10-CM

## 2021-12-08 DIAGNOSIS — G89.29 CHRONIC PAIN OF BOTH KNEES: ICD-10-CM

## 2021-12-08 DIAGNOSIS — Z12.31 SCREENING MAMMOGRAM FOR BREAST CANCER: ICD-10-CM

## 2021-12-08 DIAGNOSIS — E11.9 TYPE 2 DIABETES MELLITUS WITHOUT COMPLICATION, WITHOUT LONG-TERM CURRENT USE OF INSULIN (HCC): ICD-10-CM

## 2021-12-08 PROCEDURE — G8400 PT W/DXA NO RESULTS DOC: HCPCS | Performed by: INTERNAL MEDICINE

## 2021-12-08 PROCEDURE — 1090F PRES/ABSN URINE INCON ASSESS: CPT | Performed by: INTERNAL MEDICINE

## 2021-12-08 PROCEDURE — 1123F ACP DISCUSS/DSCN MKR DOCD: CPT | Performed by: INTERNAL MEDICINE

## 2021-12-08 PROCEDURE — G8484 FLU IMMUNIZE NO ADMIN: HCPCS | Performed by: INTERNAL MEDICINE

## 2021-12-08 PROCEDURE — G8427 DOCREV CUR MEDS BY ELIG CLIN: HCPCS | Performed by: INTERNAL MEDICINE

## 2021-12-08 PROCEDURE — 99213 OFFICE O/P EST LOW 20 MIN: CPT | Performed by: INTERNAL MEDICINE

## 2021-12-08 PROCEDURE — 2022F DILAT RTA XM EVC RTNOPTHY: CPT | Performed by: INTERNAL MEDICINE

## 2021-12-08 PROCEDURE — 4040F PNEUMOC VAC/ADMIN/RCVD: CPT | Performed by: INTERNAL MEDICINE

## 2021-12-08 PROCEDURE — 3017F COLORECTAL CA SCREEN DOC REV: CPT | Performed by: INTERNAL MEDICINE

## 2021-12-08 PROCEDURE — G8417 CALC BMI ABV UP PARAM F/U: HCPCS | Performed by: INTERNAL MEDICINE

## 2021-12-08 PROCEDURE — 3044F HG A1C LEVEL LT 7.0%: CPT | Performed by: INTERNAL MEDICINE

## 2021-12-08 PROCEDURE — 1036F TOBACCO NON-USER: CPT | Performed by: INTERNAL MEDICINE

## 2021-12-08 ASSESSMENT — ENCOUNTER SYMPTOMS
SHORTNESS OF BREATH: 0
VOICE CHANGE: 0
NAUSEA: 0
SORE THROAT: 0
ABDOMINAL PAIN: 0
WHEEZING: 0
CHEST TIGHTNESS: 0
VOMITING: 0

## 2021-12-08 NOTE — PROGRESS NOTES
HPI:  Patient comes in today for follow-up she states that she decided she does not want to take the Wellbutrin and the does not want to see a psychiatrist.  Patient states that she is under more stress because her  is losing his job at the end of the months and they would likely have to move again she has been moving between 27 houses and states in the past several years and is very stressed about this move because of her knee pain. Review of Systems   Constitutional: Negative for chills, fever and unexpected weight change. HENT: Negative for postnasal drip, sore throat and voice change. Respiratory: Negative for chest tightness, shortness of breath and wheezing. Cardiovascular: Negative for chest pain and leg swelling. Gastrointestinal: Negative for abdominal pain, nausea and vomiting. Genitourinary: Negative. Musculoskeletal: Positive for arthralgias (knee bilateral.) and gait problem. Negative for joint swelling. Skin: Negative for rash and wound. Psychiatric/Behavioral: Negative. Past Medical History:   Diagnosis Date    Chronic kidney disease     Diabetes mellitus ([facility] [address]     Hearing loss     Hypothyroidism     Obesity     Osteoarthritis     Thoracic and lumbosacral neuritis     Trochanteric bursitis      Past Surgical History:   Procedure Laterality Date    CYST REMOVAL      ECTOPIC PREGNANCY SURGERY       No family history on file.    Social History     Tobacco Use    Smoking status: Former Smoker     Packs/day: 0.25     Years: 10.00     Pack years: 2.50     Types: Cigarettes     Quit date: 1981     Years since quittin.9    Smokeless tobacco: Never Used   Substance Use Topics    Alcohol use: Never    Drug use: Never        Current Outpatient Medications on File Prior to Visit   Medication Sig Dispense Refill    valsartan-hydroCHLOROthiazide (DIOVAN-HCT) 320-25 MG per tablet Daily 90 tablet 3    atorvastatin (LIPITOR) 40 MG tablet Take 1 tablet by mouth daily 90 tablet 3    metFORMIN (GLUCOPHAGE-XR) 750 MG extended release tablet TAKE 1 TABLET BY MOUTH WITH BREAKFAST AND 1 TABLET BY MOUTH WITH DINNER EVERY  tablet 1    metoprolol tartrate (LOPRESSOR) 50 MG tablet TAKE 1 TABLET BY MOUTH TWICE DAILY 180 tablet 1    levothyroxine (SYNTHROID) 100 MCG tablet Take 1 tablet by mouth daily 90 tablet 3     No current facility-administered medications on file prior to visit. PE:  VS:  /60   Pulse 63   Temp 97.2 °F (36.2 °C)   Resp 18   Ht 5' 4\" (1.626 m)   Wt 247 lb (112 kg)   SpO2 96%   BMI 42.40 kg/m²   General:  Alert and oriented, NAD  HEENT: Ear auricles are normal, EOMI, Conjunctivae clear  NECK:  Supple without adenopathy or thyromegaly, no carotid bruits. LUNGS:  CTA all fields  HEART:  RRR without M, R, or G  ABDOMEN:  Soft and nontender without palpable abnormalities, No renal or aortic bruits. EXTREMITIES: No ankle edema bilaterally. Neuro: No focal deficit. Lab Results   Component Value Date    WBC 7.1 11/04/2021    HGB 14.5 11/04/2021    HCT 42.5 11/04/2021     11/04/2021    CHOL 167 11/04/2021    TRIG 89 11/04/2021    HDL 56 11/04/2021    ALT 18 11/04/2021    AST 27 11/04/2021     11/04/2021    K 3.9 11/04/2021     11/04/2021    CREATININE 0.8 11/04/2021    BUN 15 11/04/2021    CO2 23 11/04/2021    TSH 1.640 11/04/2021    LABA1C 6.6 (H) 11/04/2021    LABMICR <12.0 11/04/2021        Impression/Plan:    1. Type 2 diabetes mellitus with stage 2 chronic kidney disease, without long-term current use of insulin ([facility]  2. Chronic pain of both knees  3. Essential hypertension  Comments:  Blood pressure at goal continue valsartan and metoprolol. 4. Class 3 severe obesity due to excess calories with serious comorbidity and body mass index (BMI) of 40.0 to 44.9 in adult ([facility]  5.  Type 2 diabetes mellitus without complication, without long-term current use of insulin (HCC)  Comments:  A1c 6.6 at goal continue Metformin and continue weight loss. 6. Hypothyroidism, unspecified type  7. Mixed hyperlipidemia  Comments:  LDL not at goal patient LDL is 93 that needs to be less than 80 for diabetic patient will continue to decrease animal fat in her diet. Patient counseled she already have lost 6 pounds in the past months, advised to continue weight loss decrease portions, unfortunately the weight loss is because of stress but hopefully patient can maintain weight loss and manage her stress she is not suicidal she is not hopeless and the she prefers not to see psychiatrist she is focused on the goal of weight loss so she can have knee surgery. Follow-up in 3 months patient to continue atorvastatin 40 mg for her cholesterol and will continue 100 mcg of levothyroxine for the hypothyroid.   Patient will maintain 750 mg of Metformin for treatment of diabetes and continue valsartan and metoprolol for blood pressure, blood pressure at goal.

## 2021-12-30 DIAGNOSIS — Z12.31 SCREENING MAMMOGRAM FOR BREAST CANCER: ICD-10-CM

## 2022-01-14 RX ORDER — LEVOTHYROXINE SODIUM 0.1 MG/1
100 TABLET ORAL DAILY
Qty: 90 TABLET | Refills: 3 | Status: SHIPPED | OUTPATIENT
Start: 2022-01-14

## 2022-02-10 ENCOUNTER — OFFICE VISIT (OUTPATIENT)
Dept: PRIMARY CARE CLINIC | Age: 67
End: 2022-02-10
Payer: MEDICARE

## 2022-02-10 VITALS
HEIGHT: 64 IN | DIASTOLIC BLOOD PRESSURE: 72 MMHG | WEIGHT: 248 LBS | BODY MASS INDEX: 42.34 KG/M2 | SYSTOLIC BLOOD PRESSURE: 138 MMHG | HEART RATE: 57 BPM | RESPIRATION RATE: 18 BRPM

## 2022-02-10 DIAGNOSIS — I10 ESSENTIAL HYPERTENSION: ICD-10-CM

## 2022-02-10 DIAGNOSIS — L30.9 PERIANAL DERMATITIS: Primary | ICD-10-CM

## 2022-02-10 DIAGNOSIS — N18.2 TYPE 2 DIABETES MELLITUS WITH STAGE 2 CHRONIC KIDNEY DISEASE, WITHOUT LONG-TERM CURRENT USE OF INSULIN (HCC): ICD-10-CM

## 2022-02-10 DIAGNOSIS — E11.22 TYPE 2 DIABETES MELLITUS WITH STAGE 2 CHRONIC KIDNEY DISEASE, WITHOUT LONG-TERM CURRENT USE OF INSULIN (HCC): ICD-10-CM

## 2022-02-10 DIAGNOSIS — E66.01 OBESITY, CLASS III, BMI 40-49.9 (MORBID OBESITY) (HCC): ICD-10-CM

## 2022-02-10 PROCEDURE — 1090F PRES/ABSN URINE INCON ASSESS: CPT | Performed by: INTERNAL MEDICINE

## 2022-02-10 PROCEDURE — 3017F COLORECTAL CA SCREEN DOC REV: CPT | Performed by: INTERNAL MEDICINE

## 2022-02-10 PROCEDURE — 1036F TOBACCO NON-USER: CPT | Performed by: INTERNAL MEDICINE

## 2022-02-10 PROCEDURE — 3046F HEMOGLOBIN A1C LEVEL >9.0%: CPT | Performed by: INTERNAL MEDICINE

## 2022-02-10 PROCEDURE — G8427 DOCREV CUR MEDS BY ELIG CLIN: HCPCS | Performed by: INTERNAL MEDICINE

## 2022-02-10 PROCEDURE — G8417 CALC BMI ABV UP PARAM F/U: HCPCS | Performed by: INTERNAL MEDICINE

## 2022-02-10 PROCEDURE — 2022F DILAT RTA XM EVC RTNOPTHY: CPT | Performed by: INTERNAL MEDICINE

## 2022-02-10 PROCEDURE — G8400 PT W/DXA NO RESULTS DOC: HCPCS | Performed by: INTERNAL MEDICINE

## 2022-02-10 PROCEDURE — 99213 OFFICE O/P EST LOW 20 MIN: CPT | Performed by: INTERNAL MEDICINE

## 2022-02-10 PROCEDURE — 1123F ACP DISCUSS/DSCN MKR DOCD: CPT | Performed by: INTERNAL MEDICINE

## 2022-02-10 PROCEDURE — G8484 FLU IMMUNIZE NO ADMIN: HCPCS | Performed by: INTERNAL MEDICINE

## 2022-02-10 PROCEDURE — 4040F PNEUMOC VAC/ADMIN/RCVD: CPT | Performed by: INTERNAL MEDICINE

## 2022-02-10 RX ORDER — METOPROLOL TARTRATE 50 MG/1
TABLET, FILM COATED ORAL
Qty: 180 TABLET | Refills: 1 | Status: SHIPPED
Start: 2022-02-10 | End: 2022-08-12

## 2022-02-10 RX ORDER — CLOTRIMAZOLE 1 %
CREAM (GRAM) TOPICAL
Qty: 14 G | Refills: 1 | Status: SHIPPED | OUTPATIENT
Start: 2022-02-10 | End: 2022-02-17

## 2022-02-10 ASSESSMENT — ENCOUNTER SYMPTOMS
SORE THROAT: 0
CONSTIPATION: 0
SHORTNESS OF BREATH: 0
ROS SKIN COMMENTS: SEE HPI
VOICE CHANGE: 0
WHEEZING: 0
ABDOMINAL PAIN: 0
CHEST TIGHTNESS: 0
VOMITING: 0
NAUSEA: 0
DIARRHEA: 0

## 2022-02-10 NOTE — PROGRESS NOTES
HPI:  Patient comes in today for complaint of a blister at the perianal area. The patient states that she was very constipated and she was taking a lot of medication to try to get her bowels to move and then she developed recurrent diarrhea and she was trying to clean herself and she said that she became raw and the area around her perineum and there was a blister there and it is getting better but she still was concerned about it. Review of Systems   Constitutional: Negative for chills, fever and unexpected weight change. HENT: Negative for postnasal drip, sore throat and voice change. Respiratory: Negative for chest tightness, shortness of breath and wheezing. Cardiovascular: Negative for chest pain and leg swelling. Gastrointestinal: Negative for abdominal pain, constipation, diarrhea, nausea and vomiting. Musculoskeletal: Negative for gait problem and joint swelling. Skin: Negative for rash and wound. See HPI   Neurological: Negative. Psychiatric/Behavioral: Negative. Past Medical History:   Diagnosis Date    Chronic kidney disease     Diabetes mellitus ([facility] [address]     Hearing loss     Hypothyroidism     Obesity     Osteoarthritis     Thoracic and lumbosacral neuritis     Trochanteric bursitis      Past Surgical History:   Procedure Laterality Date    CYST REMOVAL      ECTOPIC PREGNANCY SURGERY       No family history on file.    Social History     Tobacco Use    Smoking status: Former Smoker     Packs/day: 0.25     Years: 10.00     Pack years: 2.50     Types: Cigarettes     Quit date: 1981     Years since quittin.1    Smokeless tobacco: Never Used   Substance Use Topics    Alcohol use: Never    Drug use: Never        Current Outpatient Medications on File Prior to Visit   Medication Sig Dispense Refill    levothyroxine (SYNTHROID) 100 MCG tablet Take 1 tablet by mouth daily 90 tablet 3    valsartan-hydroCHLOROthiazide (DIOVAN-HCT) 320-25 MG per tablet Daily 90 tablet 3    atorvastatin (LIPITOR) 40 MG tablet Take 1 tablet by mouth daily 90 tablet 3    metFORMIN (GLUCOPHAGE-XR) 750 MG extended release tablet TAKE 1 TABLET BY MOUTH WITH BREAKFAST AND 1 TABLET BY MOUTH WITH DINNER EVERY  tablet 1     No current facility-administered medications on file prior to visit. PE:  VS:  /72   Pulse 57   Resp 18   Ht 5' 4\" (1.626 m)   Wt 248 lb (112.5 kg)   BMI 42.57 kg/m²   General:  Alert and oriented, NAD  HEENT: Ear auricles are normal, EOMI, Conjunctivae clear  NECK:  Supple without adenopathy or thyromegaly, no carotid bruits. LUNGS:  CTA all fields  HEART:  RRR without M, R, or G  ABDOMEN:  Soft and nontender without palpable abnormalities, No renal or aortic bruits. EXTREMITIES: No ankle edema bilaterally. Neuro: No focal deficit. Perianal exam shows scar 2 cm in length and 0.2 cm in width of a prior pilonidal cyst surgery. Minimal dermatitis surrounding the perianal area with no lumps or cysts or masses. Lab Results   Component Value Date    WBC 7.1 11/04/2021    HGB 14.5 11/04/2021    HCT 42.5 11/04/2021     11/04/2021    CHOL 167 11/04/2021    TRIG 89 11/04/2021    HDL 56 11/04/2021    ALT 18 11/04/2021    AST 27 11/04/2021     11/04/2021    K 3.9 11/04/2021     11/04/2021    CREATININE 0.8 11/04/2021    BUN 15 11/04/2021    CO2 23 11/04/2021    TSH 1.640 11/04/2021    LABA1C 6.6 (H) 11/04/2021    LABMICR <12.0 11/04/2021        Impression/Plan:    1. Perianal dermatitis  Comments:  Lotrimin cream for as needed twice daily. No hemorrhoids and no pilonidal cyst palpable. Orders:  -     clotrimazole (LOTRIMIN AF) 1 % cream; Apply topically 2 times daily. , Disp-14 g, R-1, Normal  2. Essential hypertension  Comments:  Blood pressure at goal continue metoprolol and valsartan HCT. Orders:  -     metoprolol tartrate (LOPRESSOR) 50 MG tablet; TAKE 1 TABLET BY MOUTH TWICE DAILY, Disp-180 tablet, R-1Normal  3.  Type 2 diabetes mellitus with stage 2 chronic kidney disease, without long-term current use of insulin (HCC)  Comments:  LDL at goal, to continue diabetic diet and increase activity walk 150 minutes/week, continue Metformin 750 mg twice daily. 4. Obesity, Class III, BMI 40-49.9 (morbid obesity) ([facility] [address] [address]  Comments:  Patient is trying to lose weight she is down 5 pounds in the past 3 months. Encouraged to decrease starches bread pasta potatoes and eliminate sweets.

## 2022-03-01 DIAGNOSIS — I10 ESSENTIAL HYPERTENSION: ICD-10-CM

## 2022-03-01 DIAGNOSIS — E78.2 MIXED HYPERLIPIDEMIA: ICD-10-CM

## 2022-03-01 DIAGNOSIS — E66.01 CLASS 3 SEVERE OBESITY DUE TO EXCESS CALORIES WITH SERIOUS COMORBIDITY AND BODY MASS INDEX (BMI) OF 40.0 TO 44.9 IN ADULT (HCC): ICD-10-CM

## 2022-03-01 DIAGNOSIS — E03.9 HYPOTHYROIDISM, UNSPECIFIED TYPE: ICD-10-CM

## 2022-03-01 LAB
ALBUMIN SERPL-MCNC: 4 G/DL (ref 3.5–5.2)
ALP BLD-CCNC: 93 U/L (ref 35–104)
ALT SERPL-CCNC: 17 U/L (ref 0–32)
ANION GAP SERPL CALCULATED.3IONS-SCNC: 15 MMOL/L (ref 7–16)
AST SERPL-CCNC: 25 U/L (ref 0–31)
BASOPHILS ABSOLUTE: 0.01 E9/L (ref 0–0.2)
BASOPHILS RELATIVE PERCENT: 0.1 % (ref 0–2)
BILIRUB SERPL-MCNC: 0.6 MG/DL (ref 0–1.2)
BUN BLDV-MCNC: 19 MG/DL (ref 6–23)
CALCIUM SERPL-MCNC: 9.9 MG/DL (ref 8.6–10.2)
CHLORIDE BLD-SCNC: 101 MMOL/L (ref 98–107)
CHOLESTEROL, TOTAL: 156 MG/DL (ref 0–199)
CO2: 22 MMOL/L (ref 22–29)
CREAT SERPL-MCNC: 1 MG/DL (ref 0.5–1)
EOSINOPHILS ABSOLUTE: 0.07 E9/L (ref 0.05–0.5)
EOSINOPHILS RELATIVE PERCENT: 0.7 % (ref 0–6)
GFR AFRICAN AMERICAN: >60
GFR NON-AFRICAN AMERICAN: 55 ML/MIN/1.73
GLUCOSE BLD-MCNC: 134 MG/DL (ref 74–99)
HBA1C MFR BLD: 6.8 % (ref 4–5.6)
HCT VFR BLD CALC: 45.1 % (ref 34–48)
HDLC SERPL-MCNC: 52 MG/DL
HEMOGLOBIN: 15 G/DL (ref 11.5–15.5)
IMMATURE GRANULOCYTES #: 0.04 E9/L
IMMATURE GRANULOCYTES %: 0.4 % (ref 0–5)
LDL CHOLESTEROL CALCULATED: 79 MG/DL (ref 0–99)
LYMPHOCYTES ABSOLUTE: 2.7 E9/L (ref 1.5–4)
LYMPHOCYTES RELATIVE PERCENT: 28 % (ref 20–42)
MCH RBC QN AUTO: 32.3 PG (ref 26–35)
MCHC RBC AUTO-ENTMCNC: 33.3 % (ref 32–34.5)
MCV RBC AUTO: 97.2 FL (ref 80–99.9)
MONOCYTES ABSOLUTE: 0.93 E9/L (ref 0.1–0.95)
MONOCYTES RELATIVE PERCENT: 9.7 % (ref 2–12)
NEUTROPHILS ABSOLUTE: 5.88 E9/L (ref 1.8–7.3)
NEUTROPHILS RELATIVE PERCENT: 61.1 % (ref 43–80)
PDW BLD-RTO: 13.1 FL (ref 11.5–15)
PLATELET # BLD: 246 E9/L (ref 130–450)
PMV BLD AUTO: 10.1 FL (ref 7–12)
POTASSIUM SERPL-SCNC: 4.4 MMOL/L (ref 3.5–5)
RBC # BLD: 4.64 E12/L (ref 3.5–5.5)
SODIUM BLD-SCNC: 138 MMOL/L (ref 132–146)
TOTAL PROTEIN: 7.7 G/DL (ref 6.4–8.3)
TRIGL SERPL-MCNC: 124 MG/DL (ref 0–149)
TSH SERPL DL<=0.05 MIU/L-ACNC: 1.2 UIU/ML (ref 0.27–4.2)
VLDLC SERPL CALC-MCNC: 25 MG/DL
WBC # BLD: 9.6 E9/L (ref 4.5–11.5)

## 2022-03-07 RX ORDER — METFORMIN HYDROCHLORIDE 750 MG/1
TABLET, EXTENDED RELEASE ORAL
Qty: 180 TABLET | Refills: 1 | Status: CANCELLED | OUTPATIENT
Start: 2022-03-07

## 2022-03-08 ENCOUNTER — OFFICE VISIT (OUTPATIENT)
Dept: PRIMARY CARE CLINIC | Age: 67
End: 2022-03-08
Payer: MEDICARE

## 2022-03-08 VITALS
DIASTOLIC BLOOD PRESSURE: 62 MMHG | WEIGHT: 252 LBS | HEIGHT: 64 IN | TEMPERATURE: 97.8 F | BODY MASS INDEX: 43.02 KG/M2 | SYSTOLIC BLOOD PRESSURE: 138 MMHG | OXYGEN SATURATION: 98 % | HEART RATE: 60 BPM | RESPIRATION RATE: 18 BRPM

## 2022-03-08 DIAGNOSIS — N18.31 STAGE 3A CHRONIC KIDNEY DISEASE (HCC): ICD-10-CM

## 2022-03-08 DIAGNOSIS — E66.01 OBESITY, CLASS III, BMI 40-49.9 (MORBID OBESITY) (HCC): ICD-10-CM

## 2022-03-08 DIAGNOSIS — E11.22 TYPE 2 DIABETES MELLITUS WITH STAGE 2 CHRONIC KIDNEY DISEASE, WITHOUT LONG-TERM CURRENT USE OF INSULIN (HCC): ICD-10-CM

## 2022-03-08 DIAGNOSIS — E78.2 MIXED HYPERLIPIDEMIA: ICD-10-CM

## 2022-03-08 DIAGNOSIS — I10 ESSENTIAL HYPERTENSION: Primary | ICD-10-CM

## 2022-03-08 DIAGNOSIS — E03.9 HYPOTHYROIDISM, UNSPECIFIED TYPE: ICD-10-CM

## 2022-03-08 DIAGNOSIS — N18.2 TYPE 2 DIABETES MELLITUS WITH STAGE 2 CHRONIC KIDNEY DISEASE, WITHOUT LONG-TERM CURRENT USE OF INSULIN (HCC): ICD-10-CM

## 2022-03-08 PROCEDURE — 3044F HG A1C LEVEL LT 7.0%: CPT | Performed by: INTERNAL MEDICINE

## 2022-03-08 PROCEDURE — 1036F TOBACCO NON-USER: CPT | Performed by: INTERNAL MEDICINE

## 2022-03-08 PROCEDURE — 1090F PRES/ABSN URINE INCON ASSESS: CPT | Performed by: INTERNAL MEDICINE

## 2022-03-08 PROCEDURE — G8417 CALC BMI ABV UP PARAM F/U: HCPCS | Performed by: INTERNAL MEDICINE

## 2022-03-08 PROCEDURE — 2022F DILAT RTA XM EVC RTNOPTHY: CPT | Performed by: INTERNAL MEDICINE

## 2022-03-08 PROCEDURE — 3017F COLORECTAL CA SCREEN DOC REV: CPT | Performed by: INTERNAL MEDICINE

## 2022-03-08 PROCEDURE — 1123F ACP DISCUSS/DSCN MKR DOCD: CPT | Performed by: INTERNAL MEDICINE

## 2022-03-08 PROCEDURE — G8427 DOCREV CUR MEDS BY ELIG CLIN: HCPCS | Performed by: INTERNAL MEDICINE

## 2022-03-08 PROCEDURE — 99214 OFFICE O/P EST MOD 30 MIN: CPT | Performed by: INTERNAL MEDICINE

## 2022-03-08 PROCEDURE — 4040F PNEUMOC VAC/ADMIN/RCVD: CPT | Performed by: INTERNAL MEDICINE

## 2022-03-08 PROCEDURE — G8400 PT W/DXA NO RESULTS DOC: HCPCS | Performed by: INTERNAL MEDICINE

## 2022-03-08 PROCEDURE — G8484 FLU IMMUNIZE NO ADMIN: HCPCS | Performed by: INTERNAL MEDICINE

## 2022-03-08 RX ORDER — METFORMIN HYDROCHLORIDE 750 MG/1
TABLET, EXTENDED RELEASE ORAL
Qty: 180 TABLET | Refills: 3 | Status: SHIPPED | OUTPATIENT
Start: 2022-03-08

## 2022-03-08 ASSESSMENT — ENCOUNTER SYMPTOMS
SHORTNESS OF BREATH: 0
CHEST TIGHTNESS: 0
NAUSEA: 0
VOMITING: 0
VOICE CHANGE: 0
WHEEZING: 0
ABDOMINAL PAIN: 0
SORE THROAT: 0

## 2022-03-08 NOTE — PROGRESS NOTES
HPI:  Patient comes in today for follow-up the patient states that the blister that was bothering her in her buttock has resolved completely. Patient states that she is not doing very well with her diet lately she used to write down what she eats and since she stopped doing that she is not doing as good with her diet, patient is planning to go back and writing down what she eats and to do better. Review of Systems   Constitutional: Negative for chills, fever and unexpected weight change. HENT: Positive for hearing loss (Patient has bilateral hearing aids). Negative for postnasal drip, sore throat and voice change. Respiratory: Negative for chest tightness, shortness of breath and wheezing. Cardiovascular: Negative for chest pain and leg swelling. Gastrointestinal: Negative for abdominal pain, nausea and vomiting. Musculoskeletal: Negative for gait problem and joint swelling. Skin: Negative for rash and wound. Neurological: Negative. Psychiatric/Behavioral: Negative. Past Medical History:   Diagnosis Date    Chronic kidney disease     Diabetes mellitus ([facility] [address]     Hearing loss     Hypothyroidism     Obesity     Osteoarthritis     Thoracic and lumbosacral neuritis     Trochanteric bursitis      Past Surgical History:   Procedure Laterality Date    CYST REMOVAL      ECTOPIC PREGNANCY SURGERY       No family history on file.    Social History     Tobacco Use    Smoking status: Former Smoker     Packs/day: 0.25     Years: 10.00     Pack years: 2.50     Types: Cigarettes     Quit date: 1981     Years since quittin.2    Smokeless tobacco: Never Used   Substance Use Topics    Alcohol use: Never    Drug use: Never        Current Outpatient Medications on File Prior to Visit   Medication Sig Dispense Refill    metoprolol tartrate (LOPRESSOR) 50 MG tablet TAKE 1 TABLET BY MOUTH TWICE DAILY 180 tablet 1    levothyroxine (SYNTHROID) 100 MCG tablet Take 1 tablet by mouth daily 90 tablet 3    valsartan-hydroCHLOROthiazide (DIOVAN-HCT) 320-25 MG per tablet Daily 90 tablet 3    atorvastatin (LIPITOR) 40 MG tablet Take 1 tablet by mouth daily 90 tablet 3     No current facility-administered medications on file prior to visit. PE:  VS:  /62   Pulse 60   Temp 97.8 °F (36.6 °C)   Resp 18   Ht 5' 4\" (1.626 m)   Wt 252 lb (114.3 kg)   SpO2 98%   BMI 43.26 kg/m²   General:  Alert and oriented, NAD  HEENT: Ear auricles are normal, EOMI, Conjunctivae clear  NECK:  Supple without adenopathy or thyromegaly, no carotid bruits. LUNGS:  CTA all fields  HEART:  RRR without M, R, or G  ABDOMEN:  Soft and nontender without palpable abnormalities, No renal or aortic bruits. EXTREMITIES: No ankle edema bilaterally. Neuro: No focal deficit. Lab Results   Component Value Date    WBC 9.6 03/01/2022    HGB 15.0 03/01/2022    HCT 45.1 03/01/2022     03/01/2022    CHOL 156 03/01/2022    TRIG 124 03/01/2022    HDL 52 03/01/2022    ALT 17 03/01/2022    AST 25 03/01/2022     03/01/2022    K 4.4 03/01/2022     03/01/2022    CREATININE 1.0 03/01/2022    BUN 19 03/01/2022    CO2 22 03/01/2022    TSH 1.200 03/01/2022    LABA1C 6.8 (H) 03/01/2022    LABMICR <12.0 11/04/2021        Impression/Plan:    1. Essential hypertension  Comments:  Blood pressure controlled patient to continue valsartan /25 daily and metoprolol. Orders:  -     metFORMIN (GLUCOPHAGE-XR) 750 MG extended release tablet; TAKE 1 TABLET BY MOUTH WITH BREAKFAST AND 1 TABLET BY MOUTH WITH DINNER EVERY DAY, Disp-180 tablet, R-3Normal  -     Comprehensive Metabolic Panel; Future  -     CBC with Auto Differential; Future  -     TSH; Future  2.  Type 2 diabetes mellitus with stage 2 chronic kidney disease, without long-term current use of insulin ([facility])  Comments:  A1c 6.8 at goal patient to continue Metformin 500 mg twice daily  Orders:  -     metFORMIN (GLUCOPHAGE-XR) 750 MG extended release tablet; TAKE 1 TABLET BY MOUTH WITH BREAKFAST AND 1 TABLET BY MOUTH WITH DINNER EVERY DAY, Disp-180 tablet, R-3Normal  -     Comprehensive Metabolic Panel; Future  -     Hemoglobin A1C; Future  -     Lipid Panel; Future  -     CBC with Auto Differential; Future  -     Microalbumin / Creatinine Urine Ratio; Future  -     TSH; Future  3. Obesity, Class III, BMI 40-49.9 (morbid obesity) ([facility]  Comments:  Patient is trying to lose weight, advised to avoid all sugars to try to eliminate all starches if possible increase her intake of lean protein vegetables. Orders:  -     metFORMIN (GLUCOPHAGE-XR) 750 MG extended release tablet; TAKE 1 TABLET BY MOUTH WITH BREAKFAST AND 1 TABLET BY MOUTH WITH DINNER EVERY DAY, Disp-180 tablet, R-3Normal  -     Comprehensive Metabolic Panel; Future  -     Hemoglobin A1C; Future  -     Lipid Panel; Future  -     CBC with Auto Differential; Future  -     TSH; Future  4. Mixed hyperlipidemia  -     metFORMIN (GLUCOPHAGE-XR) 750 MG extended release tablet; TAKE 1 TABLET BY MOUTH WITH BREAKFAST AND 1 TABLET BY MOUTH WITH DINNER EVERY DAY, Disp-180 tablet, R-3Normal  -     Comprehensive Metabolic Panel; Future  -     Lipid Panel; Future  -     CBC with Auto Differential; Future  -     TSH; Future  5. Hypothyroidism, unspecified type  Comments:  TSH at goal patient to continue levothyroxine 100 mcg daily. Orders:  -     metFORMIN (GLUCOPHAGE-XR) 750 MG extended release tablet; TAKE 1 TABLET BY MOUTH WITH BREAKFAST AND 1 TABLET BY MOUTH WITH DINNER EVERY DAY, Disp-180 tablet, R-3Normal  -     CBC with Auto Differential; Future  -     TSH; Future  6. Stage 3a chronic kidney disease ([facility]  Comments:  GFR 55 stable patient to increase hydration and avoid any NSAIDs. Orders:  -     metFORMIN (GLUCOPHAGE-XR) 750 MG extended release tablet; TAKE 1 TABLET BY MOUTH WITH BREAKFAST AND 1 TABLET BY MOUTH WITH DINNER EVERY DAY, Disp-180 tablet, R-3Normal  -     Comprehensive Metabolic Panel;  Future  -     CBC with Auto Differential; Future  -     Microalbumin / Creatinine Urine Ratio; Future  -     TSH; Future  Patient counseled she likes a lot of pasta/spaghetti advised to increase her intake of lean meat and vegetables, if she has any starch it should be in very small portions. Patient voices understanding.

## 2022-06-01 DIAGNOSIS — N18.31 STAGE 3A CHRONIC KIDNEY DISEASE (HCC): ICD-10-CM

## 2022-06-01 DIAGNOSIS — E78.2 MIXED HYPERLIPIDEMIA: ICD-10-CM

## 2022-06-01 DIAGNOSIS — E03.9 HYPOTHYROIDISM, UNSPECIFIED TYPE: ICD-10-CM

## 2022-06-01 DIAGNOSIS — I10 ESSENTIAL HYPERTENSION: ICD-10-CM

## 2022-06-01 DIAGNOSIS — E11.22 TYPE 2 DIABETES MELLITUS WITH STAGE 2 CHRONIC KIDNEY DISEASE, WITHOUT LONG-TERM CURRENT USE OF INSULIN (HCC): ICD-10-CM

## 2022-06-01 DIAGNOSIS — N18.2 TYPE 2 DIABETES MELLITUS WITH STAGE 2 CHRONIC KIDNEY DISEASE, WITHOUT LONG-TERM CURRENT USE OF INSULIN (HCC): ICD-10-CM

## 2022-06-01 DIAGNOSIS — E66.01 OBESITY, CLASS III, BMI 40-49.9 (MORBID OBESITY) (HCC): ICD-10-CM

## 2022-06-01 LAB
ALBUMIN SERPL-MCNC: 4.1 G/DL (ref 3.5–5.2)
ALP BLD-CCNC: 91 U/L (ref 35–104)
ALT SERPL-CCNC: 16 U/L (ref 0–32)
ANION GAP SERPL CALCULATED.3IONS-SCNC: 17 MMOL/L (ref 7–16)
AST SERPL-CCNC: 26 U/L (ref 0–31)
BASOPHILS ABSOLUTE: 0.02 E9/L (ref 0–0.2)
BASOPHILS RELATIVE PERCENT: 0.2 % (ref 0–2)
BILIRUB SERPL-MCNC: 0.7 MG/DL (ref 0–1.2)
BUN BLDV-MCNC: 20 MG/DL (ref 6–23)
CALCIUM SERPL-MCNC: 9.6 MG/DL (ref 8.6–10.2)
CHLORIDE BLD-SCNC: 103 MMOL/L (ref 98–107)
CHOLESTEROL, TOTAL: 135 MG/DL (ref 0–199)
CO2: 21 MMOL/L (ref 22–29)
CREAT SERPL-MCNC: 0.9 MG/DL (ref 0.5–1)
EOSINOPHILS ABSOLUTE: 0.09 E9/L (ref 0.05–0.5)
EOSINOPHILS RELATIVE PERCENT: 1.1 % (ref 0–6)
GFR AFRICAN AMERICAN: >60
GFR NON-AFRICAN AMERICAN: >60 ML/MIN/1.73
GLUCOSE BLD-MCNC: 137 MG/DL (ref 74–99)
HBA1C MFR BLD: 6.8 % (ref 4–5.6)
HCT VFR BLD CALC: 41.6 % (ref 34–48)
HDLC SERPL-MCNC: 57 MG/DL
HEMOGLOBIN: 14.2 G/DL (ref 11.5–15.5)
IMMATURE GRANULOCYTES #: 0.03 E9/L
IMMATURE GRANULOCYTES %: 0.4 % (ref 0–5)
LDL CHOLESTEROL CALCULATED: 64 MG/DL (ref 0–99)
LYMPHOCYTES ABSOLUTE: 2.39 E9/L (ref 1.5–4)
LYMPHOCYTES RELATIVE PERCENT: 28.1 % (ref 20–42)
MCH RBC QN AUTO: 32.2 PG (ref 26–35)
MCHC RBC AUTO-ENTMCNC: 34.1 % (ref 32–34.5)
MCV RBC AUTO: 94.3 FL (ref 80–99.9)
MONOCYTES ABSOLUTE: 1.03 E9/L (ref 0.1–0.95)
MONOCYTES RELATIVE PERCENT: 12.1 % (ref 2–12)
NEUTROPHILS ABSOLUTE: 4.95 E9/L (ref 1.8–7.3)
NEUTROPHILS RELATIVE PERCENT: 58.1 % (ref 43–80)
PDW BLD-RTO: 12.9 FL (ref 11.5–15)
PLATELET # BLD: 232 E9/L (ref 130–450)
PMV BLD AUTO: 9.9 FL (ref 7–12)
POTASSIUM SERPL-SCNC: 4 MMOL/L (ref 3.5–5)
RBC # BLD: 4.41 E12/L (ref 3.5–5.5)
SODIUM BLD-SCNC: 141 MMOL/L (ref 132–146)
TOTAL PROTEIN: 7.1 G/DL (ref 6.4–8.3)
TRIGL SERPL-MCNC: 70 MG/DL (ref 0–149)
TSH SERPL DL<=0.05 MIU/L-ACNC: 1.53 UIU/ML (ref 0.27–4.2)
VLDLC SERPL CALC-MCNC: 14 MG/DL
WBC # BLD: 8.5 E9/L (ref 4.5–11.5)

## 2022-06-08 ENCOUNTER — OFFICE VISIT (OUTPATIENT)
Dept: PRIMARY CARE CLINIC | Age: 67
End: 2022-06-08
Payer: MEDICARE

## 2022-06-08 VITALS
HEART RATE: 60 BPM | TEMPERATURE: 97.1 F | HEIGHT: 64 IN | WEIGHT: 253 LBS | RESPIRATION RATE: 18 BRPM | SYSTOLIC BLOOD PRESSURE: 126 MMHG | DIASTOLIC BLOOD PRESSURE: 74 MMHG | BODY MASS INDEX: 43.19 KG/M2 | OXYGEN SATURATION: 98 %

## 2022-06-08 DIAGNOSIS — N18.2 TYPE 2 DIABETES MELLITUS WITH STAGE 2 CHRONIC KIDNEY DISEASE, WITHOUT LONG-TERM CURRENT USE OF INSULIN (HCC): Primary | ICD-10-CM

## 2022-06-08 DIAGNOSIS — I10 ESSENTIAL HYPERTENSION: ICD-10-CM

## 2022-06-08 DIAGNOSIS — F33.9 RECURRENT MAJOR DEPRESSIVE DISORDER, REMISSION STATUS UNSPECIFIED (HCC): ICD-10-CM

## 2022-06-08 DIAGNOSIS — M17.0 PRIMARY OSTEOARTHRITIS OF BOTH KNEES: ICD-10-CM

## 2022-06-08 DIAGNOSIS — E03.9 HYPOTHYROIDISM, UNSPECIFIED TYPE: ICD-10-CM

## 2022-06-08 DIAGNOSIS — E66.01 OBESITY, CLASS III, BMI 40-49.9 (MORBID OBESITY) (HCC): ICD-10-CM

## 2022-06-08 DIAGNOSIS — H91.93 BILATERAL DEAFNESS: ICD-10-CM

## 2022-06-08 DIAGNOSIS — E11.22 TYPE 2 DIABETES MELLITUS WITH STAGE 2 CHRONIC KIDNEY DISEASE, WITHOUT LONG-TERM CURRENT USE OF INSULIN (HCC): Primary | ICD-10-CM

## 2022-06-08 DIAGNOSIS — E78.2 MIXED HYPERLIPIDEMIA: ICD-10-CM

## 2022-06-08 PROCEDURE — 1036F TOBACCO NON-USER: CPT | Performed by: INTERNAL MEDICINE

## 2022-06-08 PROCEDURE — 1123F ACP DISCUSS/DSCN MKR DOCD: CPT | Performed by: INTERNAL MEDICINE

## 2022-06-08 PROCEDURE — 3017F COLORECTAL CA SCREEN DOC REV: CPT | Performed by: INTERNAL MEDICINE

## 2022-06-08 PROCEDURE — 99214 OFFICE O/P EST MOD 30 MIN: CPT | Performed by: INTERNAL MEDICINE

## 2022-06-08 PROCEDURE — 2022F DILAT RTA XM EVC RTNOPTHY: CPT | Performed by: INTERNAL MEDICINE

## 2022-06-08 PROCEDURE — G8427 DOCREV CUR MEDS BY ELIG CLIN: HCPCS | Performed by: INTERNAL MEDICINE

## 2022-06-08 PROCEDURE — 3044F HG A1C LEVEL LT 7.0%: CPT | Performed by: INTERNAL MEDICINE

## 2022-06-08 PROCEDURE — G8400 PT W/DXA NO RESULTS DOC: HCPCS | Performed by: INTERNAL MEDICINE

## 2022-06-08 PROCEDURE — 1090F PRES/ABSN URINE INCON ASSESS: CPT | Performed by: INTERNAL MEDICINE

## 2022-06-08 PROCEDURE — G8417 CALC BMI ABV UP PARAM F/U: HCPCS | Performed by: INTERNAL MEDICINE

## 2022-06-08 SDOH — ECONOMIC STABILITY: FOOD INSECURITY: WITHIN THE PAST 12 MONTHS, YOU WORRIED THAT YOUR FOOD WOULD RUN OUT BEFORE YOU GOT MONEY TO BUY MORE.: NEVER TRUE

## 2022-06-08 SDOH — ECONOMIC STABILITY: FOOD INSECURITY: WITHIN THE PAST 12 MONTHS, THE FOOD YOU BOUGHT JUST DIDN'T LAST AND YOU DIDN'T HAVE MONEY TO GET MORE.: NEVER TRUE

## 2022-06-08 ASSESSMENT — ENCOUNTER SYMPTOMS
CHEST TIGHTNESS: 0
VOMITING: 0
NAUSEA: 0
WHEEZING: 0
SHORTNESS OF BREATH: 0
SORE THROAT: 0
ABDOMINAL PAIN: 0
VOICE CHANGE: 0

## 2022-06-08 ASSESSMENT — PATIENT HEALTH QUESTIONNAIRE - PHQ9
5. POOR APPETITE OR OVEREATING: 3
3. TROUBLE FALLING OR STAYING ASLEEP: 1
9. THOUGHTS THAT YOU WOULD BE BETTER OFF DEAD, OR OF HURTING YOURSELF: 0
SUM OF ALL RESPONSES TO PHQ QUESTIONS 1-9: 9
10. IF YOU CHECKED OFF ANY PROBLEMS, HOW DIFFICULT HAVE THESE PROBLEMS MADE IT FOR YOU TO DO YOUR WORK, TAKE CARE OF THINGS AT HOME, OR GET ALONG WITH OTHER PEOPLE: 0
SUM OF ALL RESPONSES TO PHQ QUESTIONS 1-9: 9
SUM OF ALL RESPONSES TO PHQ QUESTIONS 1-9: 9
8. MOVING OR SPEAKING SO SLOWLY THAT OTHER PEOPLE COULD HAVE NOTICED. OR THE OPPOSITE, BEING SO FIGETY OR RESTLESS THAT YOU HAVE BEEN MOVING AROUND A LOT MORE THAN USUAL: 0
1. LITTLE INTEREST OR PLEASURE IN DOING THINGS: 0
6. FEELING BAD ABOUT YOURSELF - OR THAT YOU ARE A FAILURE OR HAVE LET YOURSELF OR YOUR FAMILY DOWN: 1
2. FEELING DOWN, DEPRESSED OR HOPELESS: 0
7. TROUBLE CONCENTRATING ON THINGS, SUCH AS READING THE NEWSPAPER OR WATCHING TELEVISION: 1
SUM OF ALL RESPONSES TO PHQ QUESTIONS 1-9: 9
4. FEELING TIRED OR HAVING LITTLE ENERGY: 3
SUM OF ALL RESPONSES TO PHQ9 QUESTIONS 1 & 2: 0

## 2022-06-08 ASSESSMENT — SOCIAL DETERMINANTS OF HEALTH (SDOH): HOW HARD IS IT FOR YOU TO PAY FOR THE VERY BASICS LIKE FOOD, HOUSING, MEDICAL CARE, AND HEATING?: NOT HARD AT ALL

## 2022-06-08 NOTE — PROGRESS NOTES
HPI:  Patient comes in today for follow-up on her blood work for follow-up on the diabetes high blood pressure patient also states she feels depressed she lost few friends lately also they went her and her  to pick burial site for them and also patient states that she misses work she used to always be a working person and now because of the knee pains and the weight she is unable to resume any job. Patient states she has not been eating right because of the depression have been eating unhealthy food. Has not been exercising. Review of Systems   Constitutional: Negative for chills, fever and unexpected weight change. HENT: Negative for postnasal drip, sore throat and voice change. Respiratory: Negative for chest tightness, shortness of breath and wheezing. Cardiovascular: Negative for chest pain and leg swelling. Gastrointestinal: Negative for abdominal pain, nausea and vomiting. Genitourinary: Negative. Musculoskeletal: Positive for arthralgias (Knee pain) and gait problem. Negative for joint swelling. Skin: Negative for rash and wound. Neurological: Negative for dizziness and light-headedness. Psychiatric/Behavioral: Negative for suicidal ideas. See HPI. Past Medical History:   Diagnosis Date    Chronic kidney disease     Diabetes mellitus ([facility] [address] [facility]     Hearing loss     Hypothyroidism     Obesity     Osteoarthritis     Thoracic and lumbosacral neuritis     Trochanteric bursitis      Past Surgical History:   Procedure Laterality Date    CYST REMOVAL      ECTOPIC PREGNANCY SURGERY       No family history on file.    Social History     Tobacco Use    Smoking status: Former Smoker     Packs/day: 0.25     Years: 10.00     Pack years: 2.50     Types: Cigarettes     Quit date: 1981     Years since quittin.4    Smokeless tobacco: Never Used   Substance Use Topics    Alcohol use: Never    Drug use: Never        Current Outpatient Medications on File Prior to Visit   Medication Sig Dispense Refill    metFORMIN (GLUCOPHAGE-XR) 750 MG extended release tablet TAKE 1 TABLET BY MOUTH WITH BREAKFAST AND 1 TABLET BY MOUTH WITH DINNER EVERY  tablet 3    metoprolol tartrate (LOPRESSOR) 50 MG tablet TAKE 1 TABLET BY MOUTH TWICE DAILY 180 tablet 1    levothyroxine (SYNTHROID) 100 MCG tablet Take 1 tablet by mouth daily 90 tablet 3    valsartan-hydroCHLOROthiazide (DIOVAN-HCT) 320-25 MG per tablet Daily 90 tablet 3    atorvastatin (LIPITOR) 40 MG tablet Take 1 tablet by mouth daily 90 tablet 3     No current facility-administered medications on file prior to visit. PE:  VS:  /74   Pulse 60   Temp 97.1 °F (36.2 °C)   Resp 18   Ht 5' 4\" (1.626 m)   Wt 253 lb (114.8 kg)   SpO2 98%   BMI 43.43 kg/m²   General:  Alert and oriented, NAD  HEENT: Ear auricles are normal, EOMI, Conjunctivae clear  NECK:  Supple without adenopathy or thyromegaly, no carotid bruits. LUNGS:  CTA all fields, symmetrical expansion with no wheezes no rales. HEART:  RRR without M, R, or G, no precordial heave. ABDOMEN:  Soft and nontender without palpable abnormalities, No renal or aortic bruits. EXTREMITIES: No ankle edema bilaterally. Neuro: No focal deficit. Lab Results   Component Value Date    WBC 8.5 06/01/2022    HGB 14.2 06/01/2022    HCT 41.6 06/01/2022     06/01/2022    CHOL 135 06/01/2022    TRIG 70 06/01/2022    HDL 57 06/01/2022    ALT 16 06/01/2022    AST 26 06/01/2022     06/01/2022    K 4.0 06/01/2022     06/01/2022    CREATININE 0.9 06/01/2022    BUN 20 06/01/2022    CO2 21 (L) 06/01/2022    TSH 1.530 06/01/2022    LABA1C 6.8 (H) 06/01/2022    LABMICR <12.0 11/04/2021        Impression/Plan:    1. Type 2 diabetes mellitus with stage 2 chronic kidney disease, without long-term current use of insulin (HCC)  Comments:  Patient unhappy with her A1c of 6.8, taking the metformin, will add Rybelsus 3 mg daily.   Take on an empty stomach with 2 sips of water  Orders:  -     Semaglutide 3 MG TABS; Take 1 tablet by mouth daily, Disp-30 tablet, R-5Print  2. Obesity, Class III, BMI 40-49.9 (morbid obesity) ([facility]  Comments:  Patient is motivated to lose weight she has been busy in the last few months and did not exercise. She has been depressed because she lost some friends  Orders:  -     Meek Earl MD, [facility]  3. Essential hypertension  Comments:  Blood pressure at goal patient to continue valsartan HCT same dose and metoprolol twice daily. 4. Mixed hyperlipidemia  Comments:  LDL at goal patient to continue atorvastatin 40 mg daily, normal liver function and kidney function. 5. Hypothyroidism, unspecified type  Comments:  TSH at goal patient to continue levothyroxine 100 mcg daily. 6. Recurrent major depressive disorder, remission status unspecified ([facility]  Comments:  Patient states she is depressed because she used to be an active person and working and she admits that but cannot work because of the knee pain  7. Bilateral deafness  8. Primary osteoarthritis of both knees  Patient does not want to take medication for depression she is trying to walk up and down her driveway, trying to go to an exercise place to mingle with people. Patient advised thyroid is at goal to continue current medication  Blood pressure is excellent on valsartan and metoprolol continue the same dose  Weight is not up patient will start Rybelsus that will improve her A1c as well as help her with the weight, patient did well with it when it was at 3 mg but did not tolerate upping the dose in the past.  It gave her some GI symptoms. Sample of [place] will be given to the patient and prescription.   Follow-up in 1 months to evaluate effect of Rybelsus

## 2022-06-15 ENCOUNTER — TELEPHONE (OUTPATIENT)
Dept: BARIATRICS/WEIGHT MGMT | Age: 67
End: 2022-06-15

## 2022-06-21 ENCOUNTER — TELEPHONE (OUTPATIENT)
Dept: BARIATRICS/WEIGHT MGMT | Age: 67
End: 2022-06-21

## 2022-06-29 ENCOUNTER — TELEPHONE (OUTPATIENT)
Dept: BARIATRICS/WEIGHT MGMT | Age: 67
End: 2022-06-29

## 2022-07-01 ENCOUNTER — TELEPHONE (OUTPATIENT)
Dept: BARIATRICS/WEIGHT MGMT | Age: 67
End: 2022-07-01

## 2022-07-01 NOTE — TELEPHONE ENCOUNTER
Patient left voicemail on facility voicemail that they are not interested in non surgical / medical weight loss referral closed.

## 2022-08-10 NOTE — TELEPHONE ENCOUNTER
Changed and sent
Ok to change& send
Please advise
Pt would like to go to [facility] for PT.  Can we please change referral to internal referral?
[Time Spent: ___ minutes] : I have spent [unfilled] minutes of time on the encounter.

## 2022-08-11 DIAGNOSIS — I10 ESSENTIAL HYPERTENSION: ICD-10-CM

## 2022-08-12 RX ORDER — METOPROLOL TARTRATE 50 MG/1
TABLET, FILM COATED ORAL
Qty: 180 TABLET | Refills: 1 | OUTPATIENT
Start: 2022-08-12

## 2022-08-12 RX ORDER — METOPROLOL TARTRATE 50 MG/1
TABLET, FILM COATED ORAL
Qty: 180 TABLET | Refills: 1 | Status: SHIPPED | OUTPATIENT
Start: 2022-08-12

## 2022-08-12 NOTE — TELEPHONE ENCOUNTER
Last Appointment:  6/8/2022  Future Appointments   Date Time Provider Jose Hanna   9/15/2022 12:00 PM MD Everett Huang 55

## 2022-09-07 DIAGNOSIS — E78.2 MIXED HYPERLIPIDEMIA: ICD-10-CM

## 2022-09-07 DIAGNOSIS — E11.22 TYPE 2 DIABETES MELLITUS WITH STAGE 2 CHRONIC KIDNEY DISEASE, WITHOUT LONG-TERM CURRENT USE OF INSULIN (HCC): ICD-10-CM

## 2022-09-07 DIAGNOSIS — N18.2 TYPE 2 DIABETES MELLITUS WITH STAGE 2 CHRONIC KIDNEY DISEASE, WITHOUT LONG-TERM CURRENT USE OF INSULIN (HCC): ICD-10-CM

## 2022-09-07 DIAGNOSIS — E03.9 HYPOTHYROIDISM, UNSPECIFIED TYPE: ICD-10-CM

## 2022-09-07 DIAGNOSIS — I10 ESSENTIAL HYPERTENSION: ICD-10-CM

## 2022-09-07 LAB
ALBUMIN SERPL-MCNC: 4.2 G/DL (ref 3.5–5.2)
ALP BLD-CCNC: 93 U/L (ref 35–104)
ALT SERPL-CCNC: 19 U/L (ref 0–32)
ANION GAP SERPL CALCULATED.3IONS-SCNC: 16 MMOL/L (ref 7–16)
AST SERPL-CCNC: 28 U/L (ref 0–31)
BASOPHILS ABSOLUTE: 0.02 E9/L (ref 0–0.2)
BASOPHILS RELATIVE PERCENT: 0.2 % (ref 0–2)
BILIRUB SERPL-MCNC: 0.7 MG/DL (ref 0–1.2)
BUN BLDV-MCNC: 22 MG/DL (ref 6–23)
CALCIUM SERPL-MCNC: 9.9 MG/DL (ref 8.6–10.2)
CHLORIDE BLD-SCNC: 104 MMOL/L (ref 98–107)
CHOLESTEROL, TOTAL: 145 MG/DL (ref 0–199)
CO2: 22 MMOL/L (ref 22–29)
CREAT SERPL-MCNC: 0.9 MG/DL (ref 0.5–1)
EOSINOPHILS ABSOLUTE: 0.05 E9/L (ref 0.05–0.5)
EOSINOPHILS RELATIVE PERCENT: 0.6 % (ref 0–6)
GFR AFRICAN AMERICAN: >60
GFR NON-AFRICAN AMERICAN: >60 ML/MIN/1.73
GLUCOSE BLD-MCNC: 135 MG/DL (ref 74–99)
HBA1C MFR BLD: 6.7 % (ref 4–5.6)
HCT VFR BLD CALC: 43.6 % (ref 34–48)
HDLC SERPL-MCNC: 52 MG/DL
HEMOGLOBIN: 15 G/DL (ref 11.5–15.5)
IMMATURE GRANULOCYTES #: 0.03 E9/L
IMMATURE GRANULOCYTES %: 0.3 % (ref 0–5)
LDL CHOLESTEROL CALCULATED: 75 MG/DL (ref 0–99)
LYMPHOCYTES ABSOLUTE: 2.18 E9/L (ref 1.5–4)
LYMPHOCYTES RELATIVE PERCENT: 24.7 % (ref 20–42)
MCH RBC QN AUTO: 33 PG (ref 26–35)
MCHC RBC AUTO-ENTMCNC: 34.4 % (ref 32–34.5)
MCV RBC AUTO: 96 FL (ref 80–99.9)
MONOCYTES ABSOLUTE: 0.87 E9/L (ref 0.1–0.95)
MONOCYTES RELATIVE PERCENT: 9.8 % (ref 2–12)
NEUTROPHILS ABSOLUTE: 5.69 E9/L (ref 1.8–7.3)
NEUTROPHILS RELATIVE PERCENT: 64.4 % (ref 43–80)
PDW BLD-RTO: 12.8 FL (ref 11.5–15)
PLATELET # BLD: 237 E9/L (ref 130–450)
PMV BLD AUTO: 10.1 FL (ref 7–12)
POTASSIUM SERPL-SCNC: 4.1 MMOL/L (ref 3.5–5)
RBC # BLD: 4.54 E12/L (ref 3.5–5.5)
SODIUM BLD-SCNC: 142 MMOL/L (ref 132–146)
TOTAL PROTEIN: 7.6 G/DL (ref 6.4–8.3)
TRIGL SERPL-MCNC: 91 MG/DL (ref 0–149)
TSH SERPL DL<=0.05 MIU/L-ACNC: 1.43 UIU/ML (ref 0.27–4.2)
VLDLC SERPL CALC-MCNC: 18 MG/DL
WBC # BLD: 8.8 E9/L (ref 4.5–11.5)

## 2022-09-10 NOTE — ACP (ADVANCE CARE PLANNING)
Discussed with patient advanced directives today and she chooses her  Fiona Joyce to be her primary decision maker, information added to the chart, patient chooses to be a full code once all measures to be taken to bring her back when needed. 8

## 2022-09-13 SDOH — HEALTH STABILITY: PHYSICAL HEALTH: ON AVERAGE, HOW MANY DAYS PER WEEK DO YOU ENGAGE IN MODERATE TO STRENUOUS EXERCISE (LIKE A BRISK WALK)?: 1 DAY

## 2022-09-13 SDOH — HEALTH STABILITY: PHYSICAL HEALTH: ON AVERAGE, HOW MANY MINUTES DO YOU ENGAGE IN EXERCISE AT THIS LEVEL?: 20 MIN

## 2022-09-13 ASSESSMENT — PATIENT HEALTH QUESTIONNAIRE - PHQ9
SUM OF ALL RESPONSES TO PHQ9 QUESTIONS 1 & 2: 0
SUM OF ALL RESPONSES TO PHQ QUESTIONS 1-9: 0
2. FEELING DOWN, DEPRESSED OR HOPELESS: 0
1. LITTLE INTEREST OR PLEASURE IN DOING THINGS: 0
SUM OF ALL RESPONSES TO PHQ QUESTIONS 1-9: 0

## 2022-09-13 ASSESSMENT — LIFESTYLE VARIABLES
HOW MANY STANDARD DRINKS CONTAINING ALCOHOL DO YOU HAVE ON A TYPICAL DAY: 0
HOW OFTEN DO YOU HAVE A DRINK CONTAINING ALCOHOL: 1
HOW OFTEN DO YOU HAVE SIX OR MORE DRINKS ON ONE OCCASION: 1
HOW MANY STANDARD DRINKS CONTAINING ALCOHOL DO YOU HAVE ON A TYPICAL DAY: PATIENT DOES NOT DRINK
HOW OFTEN DO YOU HAVE A DRINK CONTAINING ALCOHOL: NEVER

## 2022-09-15 ENCOUNTER — OFFICE VISIT (OUTPATIENT)
Dept: PRIMARY CARE CLINIC | Age: 67
End: 2022-09-15
Payer: MEDICARE

## 2022-09-15 ENCOUNTER — HOSPITAL ENCOUNTER (OUTPATIENT)
Age: 67
Discharge: HOME | End: 2022-09-17
Payer: MEDICARE

## 2022-09-15 ENCOUNTER — HOSPITAL ENCOUNTER (OUTPATIENT)
Dept: GENERAL RADIOLOGY | Age: 67
Discharge: HOME | End: 2022-09-17
Payer: MEDICARE

## 2022-09-15 VITALS
HEIGHT: 64 IN | WEIGHT: 252 LBS | TEMPERATURE: 97.6 F | RESPIRATION RATE: 18 BRPM | OXYGEN SATURATION: 94 % | BODY MASS INDEX: 43.02 KG/M2 | SYSTOLIC BLOOD PRESSURE: 122 MMHG | DIASTOLIC BLOOD PRESSURE: 74 MMHG

## 2022-09-15 DIAGNOSIS — Z00.00 MEDICARE ANNUAL WELLNESS VISIT, SUBSEQUENT: Primary | ICD-10-CM

## 2022-09-15 DIAGNOSIS — E03.9 HYPOTHYROIDISM, UNSPECIFIED TYPE: ICD-10-CM

## 2022-09-15 DIAGNOSIS — N18.2 TYPE 2 DIABETES MELLITUS WITH STAGE 2 CHRONIC KIDNEY DISEASE, WITHOUT LONG-TERM CURRENT USE OF INSULIN (HCC): Primary | ICD-10-CM

## 2022-09-15 DIAGNOSIS — M54.50 CHRONIC BILATERAL LOW BACK PAIN WITHOUT SCIATICA: ICD-10-CM

## 2022-09-15 DIAGNOSIS — G89.29 CHRONIC BILATERAL LOW BACK PAIN WITHOUT SCIATICA: ICD-10-CM

## 2022-09-15 DIAGNOSIS — E11.22 TYPE 2 DIABETES MELLITUS WITH STAGE 2 CHRONIC KIDNEY DISEASE, WITHOUT LONG-TERM CURRENT USE OF INSULIN (HCC): Primary | ICD-10-CM

## 2022-09-15 DIAGNOSIS — E78.2 MIXED HYPERLIPIDEMIA: ICD-10-CM

## 2022-09-15 DIAGNOSIS — I10 ESSENTIAL HYPERTENSION: ICD-10-CM

## 2022-09-15 DIAGNOSIS — E66.01 MORBID OBESITY (HCC): ICD-10-CM

## 2022-09-15 PROCEDURE — G8427 DOCREV CUR MEDS BY ELIG CLIN: HCPCS | Performed by: INTERNAL MEDICINE

## 2022-09-15 PROCEDURE — G8417 CALC BMI ABV UP PARAM F/U: HCPCS | Performed by: INTERNAL MEDICINE

## 2022-09-15 PROCEDURE — 3017F COLORECTAL CA SCREEN DOC REV: CPT | Performed by: INTERNAL MEDICINE

## 2022-09-15 PROCEDURE — G0439 PPPS, SUBSEQ VISIT: HCPCS | Performed by: INTERNAL MEDICINE

## 2022-09-15 PROCEDURE — G8400 PT W/DXA NO RESULTS DOC: HCPCS | Performed by: INTERNAL MEDICINE

## 2022-09-15 PROCEDURE — 1090F PRES/ABSN URINE INCON ASSESS: CPT | Performed by: INTERNAL MEDICINE

## 2022-09-15 PROCEDURE — 99497 ADVNCD CARE PLAN 30 MIN: CPT | Performed by: INTERNAL MEDICINE

## 2022-09-15 PROCEDURE — 1123F ACP DISCUSS/DSCN MKR DOCD: CPT | Performed by: INTERNAL MEDICINE

## 2022-09-15 PROCEDURE — 99214 OFFICE O/P EST MOD 30 MIN: CPT | Performed by: INTERNAL MEDICINE

## 2022-09-15 PROCEDURE — 1036F TOBACCO NON-USER: CPT | Performed by: INTERNAL MEDICINE

## 2022-09-15 PROCEDURE — 72100 X-RAY EXAM L-S SPINE 2/3 VWS: CPT

## 2022-09-15 PROCEDURE — 2022F DILAT RTA XM EVC RTNOPTHY: CPT | Performed by: INTERNAL MEDICINE

## 2022-09-15 PROCEDURE — 3044F HG A1C LEVEL LT 7.0%: CPT | Performed by: INTERNAL MEDICINE

## 2022-09-15 RX ORDER — LIDOCAINE 50 MG/G
1 PATCH TOPICAL DAILY
Qty: 30 PATCH | Refills: 0 | Status: SHIPPED | OUTPATIENT
Start: 2022-09-15 | End: 2022-10-15

## 2022-09-15 RX ORDER — BACLOFEN 10 MG/1
10 TABLET ORAL NIGHTLY
Qty: 30 TABLET | Refills: 1 | Status: SHIPPED | OUTPATIENT
Start: 2022-09-15

## 2022-09-15 ASSESSMENT — ENCOUNTER SYMPTOMS
SHORTNESS OF BREATH: 0
CHEST TIGHTNESS: 0
SORE THROAT: 0
WHEEZING: 0
ABDOMINAL PAIN: 0
BACK PAIN: 1
VOICE CHANGE: 0
NAUSEA: 0
VOMITING: 0

## 2022-09-15 ASSESSMENT — PATIENT HEALTH QUESTIONNAIRE - PHQ9
SUM OF ALL RESPONSES TO PHQ QUESTIONS 1-9: 2
4. FEELING TIRED OR HAVING LITTLE ENERGY: 1
10. IF YOU CHECKED OFF ANY PROBLEMS, HOW DIFFICULT HAVE THESE PROBLEMS MADE IT FOR YOU TO DO YOUR WORK, TAKE CARE OF THINGS AT HOME, OR GET ALONG WITH OTHER PEOPLE: 0
9. THOUGHTS THAT YOU WOULD BE BETTER OFF DEAD, OR OF HURTING YOURSELF: 0
7. TROUBLE CONCENTRATING ON THINGS, SUCH AS READING THE NEWSPAPER OR WATCHING TELEVISION: 0
5. POOR APPETITE OR OVEREATING: 1
SUM OF ALL RESPONSES TO PHQ QUESTIONS 1-9: 2
6. FEELING BAD ABOUT YOURSELF - OR THAT YOU ARE A FAILURE OR HAVE LET YOURSELF OR YOUR FAMILY DOWN: 0
3. TROUBLE FALLING OR STAYING ASLEEP: 0
8. MOVING OR SPEAKING SO SLOWLY THAT OTHER PEOPLE COULD HAVE NOTICED. OR THE OPPOSITE, BEING SO FIGETY OR RESTLESS THAT YOU HAVE BEEN MOVING AROUND A LOT MORE THAN USUAL: 0
2. FEELING DOWN, DEPRESSED OR HOPELESS: 0
SUM OF ALL RESPONSES TO PHQ QUESTIONS 1-9: 2
SUM OF ALL RESPONSES TO PHQ9 QUESTIONS 1 & 2: 0
SUM OF ALL RESPONSES TO PHQ QUESTIONS 1-9: 2
1. LITTLE INTEREST OR PLEASURE IN DOING THINGS: 0

## 2022-09-15 NOTE — PROGRESS NOTES
weight on file to calculate BMI. Allergies   Allergen Reactions    [place] Flavor Hives    Penicillins Nausea Only and Swelling     Prior to Visit Medications    Medication Sig Taking?  Authorizing Provider   metoprolol tartrate (LOPRESSOR) 50 MG tablet TAKE 1 TABLET BY MOUTH TWICE DAILY Yes Mariann Aguilar MD   metFORMIN (GLUCOPHAGE-XR) 750 MG extended release tablet TAKE 1 TABLET BY MOUTH WITH BREAKFAST AND 1 TABLET BY MOUTH WITH DINNER EVERY DAY Yes Mariann Aguilar MD   levothyroxine (SYNTHROID) 100 MCG tablet Take 1 tablet by mouth daily Yes Cassandra Mercado MD   valsartan-hydroCHLOROthiazide (DIOVAN-HCT) 320-25 MG per tablet Daily Yes Cassandra Mercado MD   atorvastatin (LIPITOR) 40 MG tablet Take 1 tablet by mouth daily Yes Cassandra Mercado MD       [facility] (Including outside providers/suppliers regularly involved in providing care):   Patient Care Team:  Cassandra Mercado MD as PCP - General (Internal Medicine)  Cassandra Mercado MD as PCP - [facility] Empaneled Provider     Reviewed and updated this visit:  Meds

## 2022-09-15 NOTE — PATIENT INSTRUCTIONS
Personalized Preventive Plan for Herb Rod - 9/15/2022  Medicare offers a range of preventive health benefits. Some of the tests and screenings are paid in full while other may be subject to a deductible, co-insurance, and/or copay. Some of these benefits include a comprehensive review of your medical history including lifestyle, illnesses that may run in your family, and various assessments and screenings as appropriate. After reviewing your medical record and screening and assessments performed today your provider may have ordered immunizations, labs, imaging, and/or referrals for you. A list of these orders (if applicable) as well as your Preventive Care list are included within your After Visit Summary for your review. Other Preventive Recommendations:    A preventive eye exam performed by an eye specialist is recommended every 1-2 years to screen for glaucoma; cataracts, macular degeneration, and other eye disorders. A preventive dental visit is recommended every 6 months. Try to get at least 150 minutes of exercise per week or 10,000 steps per day on a pedometer . Order or download the FREE \"Exercise & Physical Activity: Your Everyday Guide\" from [employer]. Call 7-503.698.5905 or search [employer] online. You need 5258-7882 mg of calcium and 1641-0157 IU of vitamin D per day. It is possible to meet your calcium requirement with diet alone, but a vitamin D supplement is usually necessary to meet this goal.  When exposed to the sun, use a sunscreen that protects against both UVA and UVB radiation with an SPF of 30 or greater. Reapply every 2 to 3 hours or after sweating, drying off with a towel, or swimming. Always wear a seat belt when traveling in a car. Always wear a helmet when riding a bicycle or motorcycle.

## 2022-09-15 NOTE — PROGRESS NOTES
HPI:  Patient comes in today for complaint of low back pain patient states she fell in [place] 3 years ago and she also have been having low back pain since it has been tolerable but lately in the past few months pain is worse, pain does not shoot down to the legs. Patient did her blood work  Patient is unhappy that she is unable to lose the weight and was not able to communicate well with the weight loss clinic because of her hearing loss. Her insurance told her she will not be covered for the cost of the weight loss clinic. Review of Systems   Constitutional:  Negative for chills, fever and unexpected weight change. HENT:  Negative for postnasal drip, sore throat and voice change. Respiratory:  Negative for chest tightness, shortness of breath and wheezing. Cardiovascular:  Negative for chest pain and leg swelling. Gastrointestinal:  Negative for abdominal pain, nausea and vomiting. Musculoskeletal:  Positive for back pain. Negative for gait problem and joint swelling. Skin:  Negative for rash and wound. Neurological: Negative. Back:  Past Medical History:   Diagnosis Date    Chronic kidney disease     Diabetes mellitus ([facility] [address] [facility]     Hearing loss     Hypothyroidism     Obesity     Osteoarthritis     Thoracic and lumbosacral neuritis     Trochanteric bursitis      Past Surgical History:   Procedure Laterality Date    CYST REMOVAL      ECTOPIC PREGNANCY SURGERY       No family history on file.    Social History     Tobacco Use    Smoking status: Former     Packs/day: 0.25     Years: 10.00     Pack years: 2.50     Types: Cigarettes     Quit date: 1981     Years since quittin.7    Smokeless tobacco: Never   Substance Use Topics    Alcohol use: Never    Drug use: Never        Current Outpatient Medications on File Prior to Visit   Medication Sig Dispense Refill    metoprolol tartrate (LOPRESSOR) 50 MG tablet TAKE 1 TABLET BY MOUTH TWICE DAILY 180 tablet 1    metFORMIN (GLUCOPHAGE-XR) 750 MG extended release tablet TAKE 1 TABLET BY MOUTH WITH BREAKFAST AND 1 TABLET BY MOUTH WITH DINNER EVERY  tablet 3    levothyroxine (SYNTHROID) 100 MCG tablet Take 1 tablet by mouth daily 90 tablet 3    valsartan-hydroCHLOROthiazide (DIOVAN-HCT) 320-25 MG per tablet Daily 90 tablet 3    atorvastatin (LIPITOR) 40 MG tablet Take 1 tablet by mouth daily 90 tablet 3     No current facility-administered medications on file prior to visit. PE:  VS:  /74   Temp 97.6 °F (36.4 °C)   Resp 18   Ht 5' 4\" (1.626 m)   Wt 252 lb (114.3 kg)   SpO2 94%   BMI 43.26 kg/m²   General:  Alert and oriented, NAD  HEENT: Ear auricles are normal, EOMI, Conjunctivae clear  NECK:  Supple without adenopathy or thyromegaly, no carotid bruits. LUNGS:  CTA all fields  HEART:  RRR without M, R, or G  ABDOMEN:  Soft and nontender without palpable abnormalities, No renal or aortic bruits. EXTREMITIES: No ankle edema bilaterally. Neuro: No focal deficit. Back: Patient is tender with palpation of the lower back, leg lift bilaterally showed no evidence of radiculopathy. Lab Results   Component Value Date    WBC 8.8 09/07/2022    HGB 15.0 09/07/2022    HCT 43.6 09/07/2022     09/07/2022    CHOL 145 09/07/2022    TRIG 91 09/07/2022    HDL 52 09/07/2022    ALT 19 09/07/2022    AST 28 09/07/2022     09/07/2022    K 4.1 09/07/2022     09/07/2022    CREATININE 0.9 09/07/2022    BUN 22 09/07/2022    CO2 22 09/07/2022    TSH 1.430 09/07/2022    LABA1C 6.7 (H) 09/07/2022    LABMICR <12.0 11/04/2021        Impression/Plan:    1. Type 2 diabetes mellitus with stage 2 chronic kidney disease, without long-term current use of insulin (HCC)  Comments:  A1c 6.7 at goal patient to continue metformin 750 mg 1 tablet 2 times daily with breakfast and with dinner, normal liver function patient tolerating meds. 2. Essential hypertension  Comments:  Blood pressure at goal patient will continue metoprolol 50 mg twice daily. And valsartan /25 once daily. Continue to monitor renal function and electro  3. Morbid obesity ([address]  Comments:  Patient unable to stick with a diet she states she does not eat too much sweets, could not go to the weight loss clinic, should consider weight watchers. 4. Mixed hyperlipidemia  Comments:  LDL at goal patient will continue atorvastatin 40 mg daily, no reported adverse effects no myalgia and normal liver function. 5. Hypothyroidism, unspecified type  Comments:  TSH at goal patient will continue levothyroxine 100 mcg daily. 6. Chronic bilateral low back pain without sciatica  -     XR LUMBAR SPINE (2-3 VIEWS); Future  -     lidocaine (LIDODERM) 5 %; Place 1 patch onto the skin daily 12 hours on, 12 hours off., Disp-30 patch, R-0Normal  -     baclofen (LIORESAL) 10 MG tablet;  Take 1 tablet by mouth at bedtime, Disp-30 tablet, R-1Normal

## 2022-09-15 NOTE — ACP (ADVANCE CARE PLANNING)
Advance Care Planning     Advance Care Planning (ACP) Physician/NP/PA Conversation    Date of Conversation: 9/15/2022  Conducted with: Patient with Decision Making Capacity    Healthcare Decision Maker:      Primary Decision Maker: Adam Lanier - Spouse - [phone]    Click here to complete [address] including selection of the Healthcare Decision Maker Relationship (ie \"Primary\")  Today we documented Decision Maker(s) consistent with Legal Next of Kin hierarchy. Care Preferences:    Hospitalization: \"If your health worsens and it becomes clear that your chance of recovery is unlikely, what would be your preference regarding hospitalization? \"  The patient would prefer hospitalization. Ventilation: \"If you were unable to breath on your own and your chance of recovery was unlikely, what would be your preference about the use of a ventilator (breathing machine) if it was available to you? \"  The patient would desire the use of a ventilator. Resuscitation: \"In the event your heart stopped as a result of an underlying serious health condition, would you want attempts made to restart your heart, or would you prefer a natural death? \"  Yes, attempt to resuscitate.     treatment goals, benefit/burden of treatment options, artificial nutrition, ventilation preferences, hospitalization preferences, resuscitation preferences, end of life care preferences (vegetative state/imminent death), and the patient chooses to be full code she understands about using the ventilator the resuscitation she requests everything to be done, we discussed that vegetative state and she states she understands what a vegetative state is and that she would not wish to be on life support for years and years in a vegetative state, she understands that if that is the case then the healthcare provider will go to the primary decision maker who is her  in order to explained to them the condition and he would make the decision about pulling life support. Patient does not have a secondary decision maker she stated that she will think about it and provide name and contact.     Conversation Outcomes / Follow-Up Plan:  ACP in process - information provided, considering goals and options  Reviewed DNR/DNI and patient elects Full Code (Attempt Resuscitation)    Length of Voluntary ACP Conversation in minutes:  16 minutes    Cassandra Mercado MD

## 2022-10-20 ENCOUNTER — OFFICE VISIT (OUTPATIENT)
Dept: PRIMARY CARE CLINIC | Age: 67
End: 2022-10-20
Payer: MEDICARE

## 2022-10-20 VITALS
HEIGHT: 64 IN | WEIGHT: 248 LBS | TEMPERATURE: 97.4 F | HEART RATE: 67 BPM | BODY MASS INDEX: 42.34 KG/M2 | RESPIRATION RATE: 18 BRPM | SYSTOLIC BLOOD PRESSURE: 130 MMHG | OXYGEN SATURATION: 97 % | DIASTOLIC BLOOD PRESSURE: 82 MMHG

## 2022-10-20 DIAGNOSIS — E66.01 MORBID OBESITY (HCC): ICD-10-CM

## 2022-10-20 DIAGNOSIS — E11.9 TYPE 2 DIABETES MELLITUS WITHOUT COMPLICATION, WITHOUT LONG-TERM CURRENT USE OF INSULIN (HCC): ICD-10-CM

## 2022-10-20 DIAGNOSIS — I10 ESSENTIAL HYPERTENSION: ICD-10-CM

## 2022-10-20 DIAGNOSIS — M17.0 PRIMARY OSTEOARTHRITIS OF BOTH KNEES: ICD-10-CM

## 2022-10-20 DIAGNOSIS — E78.2 MIXED HYPERLIPIDEMIA: ICD-10-CM

## 2022-10-20 DIAGNOSIS — M47.816 SPONDYLOSIS OF LUMBAR REGION WITHOUT MYELOPATHY OR RADICULOPATHY: Primary | ICD-10-CM

## 2022-10-20 PROBLEM — N18.2 TYPE 2 DIABETES MELLITUS WITH STAGE 2 CHRONIC KIDNEY DISEASE, WITHOUT LONG-TERM CURRENT USE OF INSULIN (HCC): Status: RESOLVED | Noted: 2021-08-11 | Resolved: 2022-10-20

## 2022-10-20 PROBLEM — E11.22 TYPE 2 DIABETES MELLITUS WITH STAGE 2 CHRONIC KIDNEY DISEASE, WITHOUT LONG-TERM CURRENT USE OF INSULIN (HCC): Status: RESOLVED | Noted: 2021-08-11 | Resolved: 2022-10-20

## 2022-10-20 PROCEDURE — G8484 FLU IMMUNIZE NO ADMIN: HCPCS | Performed by: INTERNAL MEDICINE

## 2022-10-20 PROCEDURE — 2022F DILAT RTA XM EVC RTNOPTHY: CPT | Performed by: INTERNAL MEDICINE

## 2022-10-20 PROCEDURE — 1123F ACP DISCUSS/DSCN MKR DOCD: CPT | Performed by: INTERNAL MEDICINE

## 2022-10-20 PROCEDURE — 3044F HG A1C LEVEL LT 7.0%: CPT | Performed by: INTERNAL MEDICINE

## 2022-10-20 PROCEDURE — 3017F COLORECTAL CA SCREEN DOC REV: CPT | Performed by: INTERNAL MEDICINE

## 2022-10-20 PROCEDURE — 99214 OFFICE O/P EST MOD 30 MIN: CPT | Performed by: INTERNAL MEDICINE

## 2022-10-20 PROCEDURE — G8427 DOCREV CUR MEDS BY ELIG CLIN: HCPCS | Performed by: INTERNAL MEDICINE

## 2022-10-20 PROCEDURE — 1090F PRES/ABSN URINE INCON ASSESS: CPT | Performed by: INTERNAL MEDICINE

## 2022-10-20 PROCEDURE — 1036F TOBACCO NON-USER: CPT | Performed by: INTERNAL MEDICINE

## 2022-10-20 PROCEDURE — G8400 PT W/DXA NO RESULTS DOC: HCPCS | Performed by: INTERNAL MEDICINE

## 2022-10-20 PROCEDURE — G8417 CALC BMI ABV UP PARAM F/U: HCPCS | Performed by: INTERNAL MEDICINE

## 2022-10-20 RX ORDER — ATORVASTATIN CALCIUM 40 MG/1
40 TABLET, FILM COATED ORAL DAILY
Qty: 90 TABLET | Refills: 3 | Status: SHIPPED | OUTPATIENT
Start: 2022-10-20

## 2022-10-20 ASSESSMENT — ENCOUNTER SYMPTOMS
WHEEZING: 0
SHORTNESS OF BREATH: 0
ABDOMINAL PAIN: 0
BACK PAIN: 1
VOICE CHANGE: 0
VOMITING: 0
SORE THROAT: 0
NAUSEA: 0
CHEST TIGHTNESS: 0

## 2022-10-20 NOTE — PROGRESS NOTES
HPI:  Patient comes in today for patient states her back is better she is trying not to lift heavy and is this is the first week she does not have spasms in her back. Patient using the patches on the back and the muscle relaxer as needed. .    Review of Systems   Constitutional:  Negative for chills, fever and unexpected weight change. HENT:  Negative for postnasal drip, sore throat and voice change. Respiratory:  Negative for chest tightness, shortness of breath and wheezing. Cardiovascular:  Negative for chest pain and leg swelling. Gastrointestinal:  Negative for abdominal pain, nausea and vomiting. Musculoskeletal:  Positive for back pain. Negative for gait problem and joint swelling. Skin:  Negative for rash and wound. Neurological: Negative. Psychiatric/Behavioral: Negative. Past Medical History:   Diagnosis Date    Chronic kidney disease     Diabetes mellitus ([facility] [address]     Hearing loss     Hypothyroidism     Obesity     Osteoarthritis     Thoracic and lumbosacral neuritis     Trochanteric bursitis      Past Surgical History:   Procedure Laterality Date    CYST REMOVAL      ECTOPIC PREGNANCY SURGERY       No family history on file.    Social History     Tobacco Use    Smoking status: Former     Packs/day: 0.25     Years: 10.00     Pack years: 2.50     Types: Cigarettes     Quit date: 1981     Years since quittin.8    Smokeless tobacco: Never   Substance Use Topics    Alcohol use: Never    Drug use: Never        Current Outpatient Medications on File Prior to Visit   Medication Sig Dispense Refill    baclofen (LIORESAL) 10 MG tablet Take 1 tablet by mouth at bedtime 30 tablet 1    metoprolol tartrate (LOPRESSOR) 50 MG tablet TAKE 1 TABLET BY MOUTH TWICE DAILY 180 tablet 1    metFORMIN (GLUCOPHAGE-XR) 750 MG extended release tablet TAKE 1 TABLET BY MOUTH WITH BREAKFAST AND 1 TABLET BY MOUTH WITH DINNER EVERY  tablet 3    levothyroxine (SYNTHROID) 100 MCG tablet Take 1 tablet by mouth daily 90 tablet 3    valsartan-hydroCHLOROthiazide (DIOVAN-HCT) 320-25 MG per tablet Daily 90 tablet 3     No current facility-administered medications on file prior to visit. PE:  VS:  /82   Pulse 67   Temp 97.4 °F (36.3 °C) (Temporal)   Resp 18   Ht 5' 4\" (1.626 m)   Wt 248 lb (112.5 kg)   SpO2 97%   BMI 42.57 kg/m²   General:  Alert and oriented, NAD  HEENT: Ear auricles are normal, EOMI, Conjunctivae clear  NECK:  Supple without adenopathy or thyromegaly, no carotid bruits. LUNGS:  CTA all fields  HEART:  RRR without M, R, or G  ABDOMEN:  Soft and nontender without palpable abnormalities, No renal or aortic bruits. EXTREMITIES: No ankle edema bilaterally. Neuro: No focal deficit. Lab Results   Component Value Date    WBC 8.8 09/07/2022    HGB 15.0 09/07/2022    HCT 43.6 09/07/2022     09/07/2022    CHOL 145 09/07/2022    TRIG 91 09/07/2022    HDL 52 09/07/2022    LDLCALC 75 09/07/2022    ALT 19 09/07/2022    AST 28 09/07/2022     09/07/2022    K 4.1 09/07/2022     09/07/2022    CREATININE 0.9 09/07/2022    BUN 22 09/07/2022    LABGLOM >60 09/07/2022    GFRAA >60 09/07/2022    CO2 22 09/07/2022    TSH 1.430 09/07/2022    GLUCOSE 135 (H) 09/07/2022    LABA1C 6.7 (H) 09/07/2022    LABMICR <12.0 11/04/2021    LABCREA 141 11/04/2021    MALBCR - (AA) 11/04/2021         Impression/Plan:    1. Spondylosis of lumbar region without myelopathy or radiculopathy  Comments:  Patient is doing well with the patches and the muscle relaxer, denies any radicular pain down to the lower extremities, MRI if symptoms worsen. 2. Essential hypertension  -     Comprehensive Metabolic Panel; Future  -     Hemoglobin A1C; Future  -     Lipid Panel; Future  -     CBC with Auto Differential; Future  -     Microalbumin / Creatinine Urine Ratio; Future  -     TSH; Future  3. Mixed hyperlipidemia  -     Comprehensive Metabolic Panel;  Future  -     Hemoglobin A1C; Future  -     Lipid Panel; Future  -     CBC with Auto Differential; Future  -     Microalbumin / Creatinine Urine Ratio; Future  -     TSH; Future  4. Morbid obesity ([address]  Comments:  Patient lost 4 pounds encouraged to continue to do the same. 5. Type 2 diabetes mellitus without complication, without long-term current use of insulin ([facility] [address]  Comments:  Patient doing very well A1c 6.7 she is determined to lose weight. Orders:  -     Comprehensive Metabolic Panel; Future  -     Hemoglobin A1C; Future  -     Lipid Panel; Future  -     CBC with Auto Differential; Future  -     Microalbumin / Creatinine Urine Ratio; Future  -     TSH; Future  6. Primary osteoarthritis of both knees  Comments:  Patient encouraged to get steroid injections in her knees, she will think about it, advised her of referral to sports medicine when she decides to do that.

## 2022-11-13 DIAGNOSIS — I10 ESSENTIAL HYPERTENSION: ICD-10-CM

## 2022-11-14 RX ORDER — VALSARTAN AND HYDROCHLOROTHIAZIDE 320; 25 MG/1; MG/1
TABLET, FILM COATED ORAL
Qty: 90 TABLET | Refills: 3 | Status: SHIPPED | OUTPATIENT
Start: 2022-11-14

## 2023-01-04 RX ORDER — LEVOTHYROXINE SODIUM 0.1 MG/1
100 TABLET ORAL DAILY
Qty: 90 TABLET | Refills: 3 | Status: SHIPPED | OUTPATIENT
Start: 2023-01-04

## 2023-01-19 DIAGNOSIS — E78.2 MIXED HYPERLIPIDEMIA: ICD-10-CM

## 2023-01-19 DIAGNOSIS — E11.9 TYPE 2 DIABETES MELLITUS WITHOUT COMPLICATION, WITHOUT LONG-TERM CURRENT USE OF INSULIN (HCC): ICD-10-CM

## 2023-01-19 DIAGNOSIS — I10 ESSENTIAL HYPERTENSION: ICD-10-CM

## 2023-01-19 LAB
ALBUMIN SERPL-MCNC: 4.1 G/DL (ref 3.5–5.2)
ALP BLD-CCNC: 87 U/L (ref 35–104)
ALT SERPL-CCNC: 19 U/L (ref 0–32)
ANION GAP SERPL CALCULATED.3IONS-SCNC: 16 MMOL/L (ref 7–16)
AST SERPL-CCNC: 29 U/L (ref 0–31)
BASOPHILS ABSOLUTE: 0.01 E9/L (ref 0–0.2)
BASOPHILS RELATIVE PERCENT: 0.1 % (ref 0–2)
BILIRUB SERPL-MCNC: 0.6 MG/DL (ref 0–1.2)
BUN BLDV-MCNC: 18 MG/DL (ref 6–23)
CALCIUM SERPL-MCNC: 9.8 MG/DL (ref 8.6–10.2)
CHLORIDE BLD-SCNC: 101 MMOL/L (ref 98–107)
CHOLESTEROL, TOTAL: 135 MG/DL (ref 0–199)
CO2: 22 MMOL/L (ref 22–29)
CREAT SERPL-MCNC: 0.8 MG/DL (ref 0.5–1)
CREATININE URINE: 79 MG/DL (ref 29–226)
EOSINOPHILS ABSOLUTE: 0.07 E9/L (ref 0.05–0.5)
EOSINOPHILS RELATIVE PERCENT: 0.9 % (ref 0–6)
GFR SERPL CREATININE-BSD FRML MDRD: >60 ML/MIN/1.73
GLUCOSE BLD-MCNC: 126 MG/DL (ref 74–99)
HBA1C MFR BLD: 6.9 % (ref 4–5.6)
HCT VFR BLD CALC: 41.4 % (ref 34–48)
HDLC SERPL-MCNC: 58 MG/DL
HEMOGLOBIN: 14.3 G/DL (ref 11.5–15.5)
IMMATURE GRANULOCYTES #: 0.02 E9/L
IMMATURE GRANULOCYTES %: 0.2 % (ref 0–5)
LDL CHOLESTEROL CALCULATED: 61 MG/DL (ref 0–99)
LYMPHOCYTES ABSOLUTE: 2.44 E9/L (ref 1.5–4)
LYMPHOCYTES RELATIVE PERCENT: 30 % (ref 20–42)
MCH RBC QN AUTO: 32.6 PG (ref 26–35)
MCHC RBC AUTO-ENTMCNC: 34.5 % (ref 32–34.5)
MCV RBC AUTO: 94.5 FL (ref 80–99.9)
MICROALBUMIN UR-MCNC: <12 MG/L
MICROALBUMIN/CREAT UR-RTO: ABNORMAL (ref 0–30)
MONOCYTES ABSOLUTE: 0.93 E9/L (ref 0.1–0.95)
MONOCYTES RELATIVE PERCENT: 11.4 % (ref 2–12)
NEUTROPHILS ABSOLUTE: 4.66 E9/L (ref 1.8–7.3)
NEUTROPHILS RELATIVE PERCENT: 57.4 % (ref 43–80)
PDW BLD-RTO: 12.6 FL (ref 11.5–15)
PLATELET # BLD: 241 E9/L (ref 130–450)
PMV BLD AUTO: 10 FL (ref 7–12)
POTASSIUM SERPL-SCNC: 3.6 MMOL/L (ref 3.5–5)
RBC # BLD: 4.38 E12/L (ref 3.5–5.5)
SODIUM BLD-SCNC: 139 MMOL/L (ref 132–146)
TOTAL PROTEIN: 7.3 G/DL (ref 6.4–8.3)
TRIGL SERPL-MCNC: 79 MG/DL (ref 0–149)
TSH SERPL DL<=0.05 MIU/L-ACNC: 1.69 UIU/ML (ref 0.27–4.2)
VLDLC SERPL CALC-MCNC: 16 MG/DL
WBC # BLD: 8.1 E9/L (ref 4.5–11.5)

## 2023-01-26 ENCOUNTER — OFFICE VISIT (OUTPATIENT)
Dept: PRIMARY CARE CLINIC | Age: 68
End: 2023-01-26

## 2023-01-26 VITALS
RESPIRATION RATE: 18 BRPM | HEIGHT: 64 IN | TEMPERATURE: 96.8 F | OXYGEN SATURATION: 96 % | DIASTOLIC BLOOD PRESSURE: 68 MMHG | BODY MASS INDEX: 42 KG/M2 | WEIGHT: 246 LBS | HEART RATE: 59 BPM | SYSTOLIC BLOOD PRESSURE: 130 MMHG

## 2023-01-26 DIAGNOSIS — E11.9 TYPE 2 DIABETES MELLITUS WITHOUT COMPLICATION, WITHOUT LONG-TERM CURRENT USE OF INSULIN (HCC): Primary | ICD-10-CM

## 2023-01-26 DIAGNOSIS — E66.01 OBESITY, CLASS III, BMI 40-49.9 (MORBID OBESITY) (HCC): ICD-10-CM

## 2023-01-26 DIAGNOSIS — I10 ESSENTIAL HYPERTENSION: ICD-10-CM

## 2023-01-26 DIAGNOSIS — F33.9 RECURRENT MAJOR DEPRESSIVE DISORDER, REMISSION STATUS UNSPECIFIED (HCC): ICD-10-CM

## 2023-01-26 DIAGNOSIS — E78.2 MIXED HYPERLIPIDEMIA: ICD-10-CM

## 2023-01-26 ASSESSMENT — ENCOUNTER SYMPTOMS
CHEST TIGHTNESS: 0
WHEEZING: 0
SORE THROAT: 0
NAUSEA: 0
ABDOMINAL PAIN: 0
SHORTNESS OF BREATH: 0
VOICE CHANGE: 0
VOMITING: 0

## 2023-01-26 ASSESSMENT — PATIENT HEALTH QUESTIONNAIRE - PHQ9
SUM OF ALL RESPONSES TO PHQ QUESTIONS 1-9: 6
SUM OF ALL RESPONSES TO PHQ QUESTIONS 1-9: 6
4. FEELING TIRED OR HAVING LITTLE ENERGY: 0
5. POOR APPETITE OR OVEREATING: 2
3. TROUBLE FALLING OR STAYING ASLEEP: 1
10. IF YOU CHECKED OFF ANY PROBLEMS, HOW DIFFICULT HAVE THESE PROBLEMS MADE IT FOR YOU TO DO YOUR WORK, TAKE CARE OF THINGS AT HOME, OR GET ALONG WITH OTHER PEOPLE: 0
SUM OF ALL RESPONSES TO PHQ9 QUESTIONS 1 & 2: 3
SUM OF ALL RESPONSES TO PHQ QUESTIONS 1-9: 6
7. TROUBLE CONCENTRATING ON THINGS, SUCH AS READING THE NEWSPAPER OR WATCHING TELEVISION: 0
SUM OF ALL RESPONSES TO PHQ QUESTIONS 1-9: 6
8. MOVING OR SPEAKING SO SLOWLY THAT OTHER PEOPLE COULD HAVE NOTICED. OR THE OPPOSITE, BEING SO FIGETY OR RESTLESS THAT YOU HAVE BEEN MOVING AROUND A LOT MORE THAN USUAL: 0
6. FEELING BAD ABOUT YOURSELF - OR THAT YOU ARE A FAILURE OR HAVE LET YOURSELF OR YOUR FAMILY DOWN: 0
1. LITTLE INTEREST OR PLEASURE IN DOING THINGS: 2
2. FEELING DOWN, DEPRESSED OR HOPELESS: 1
9. THOUGHTS THAT YOU WOULD BE BETTER OFF DEAD, OR OF HURTING YOURSELF: 0

## 2023-01-26 NOTE — PROGRESS NOTES
HPI:  Patient comes in today The patient states that she is having a terrible 3 months her  lost his job againor patient states that she sits and cries all day or screams because of the level of the stress. Review of Systems   Constitutional:  Negative for chills, fever and unexpected weight change. HENT:  Negative for postnasal drip, sore throat and voice change. Respiratory:  Negative for chest tightness, shortness of breath and wheezing. Cardiovascular:  Negative for chest pain and leg swelling. Gastrointestinal:  Negative for abdominal pain, nausea and vomiting. Musculoskeletal:  Negative for gait problem and joint swelling. Skin:  Negative for rash and wound. Neurological: Negative. Psychiatric/Behavioral:  Positive for agitation. Negative for self-injury and suicidal ideas. Patient is stressed out she cries all day      Past Medical History:   Diagnosis Date    Chronic kidney disease     Diabetes mellitus ([facility] [address]     Hearing loss     Hypothyroidism     Obesity     Osteoarthritis     Thoracic and lumbosacral neuritis     Trochanteric bursitis      Past Surgical History:   Procedure Laterality Date    CYST REMOVAL      ECTOPIC PREGNANCY SURGERY       No family history on file.    Social History     Tobacco Use    Smoking status: Former     Packs/day: 0.25     Years: 10.00     Pack years: 2.50     Types: Cigarettes     Quit date: 1981     Years since quittin.0    Smokeless tobacco: Never   Substance Use Topics    Alcohol use: Never    Drug use: Never        Current Outpatient Medications on File Prior to Visit   Medication Sig Dispense Refill    levothyroxine (SYNTHROID) 100 MCG tablet Take 1 tablet by mouth daily 90 tablet 3    valsartan-hydroCHLOROthiazide (DIOVAN-HCT) 320-25 MG per tablet Daily 90 tablet 3    atorvastatin (LIPITOR) 40 MG tablet Take 1 tablet by mouth daily 90 tablet 3    baclofen (LIORESAL) 10 MG tablet Take 1 tablet by mouth at bedtime 30 tablet 1 metoprolol tartrate (LOPRESSOR) 50 MG tablet TAKE 1 TABLET BY MOUTH TWICE DAILY 180 tablet 1    metFORMIN (GLUCOPHAGE-XR) 750 MG extended release tablet TAKE 1 TABLET BY MOUTH WITH BREAKFAST AND 1 TABLET BY MOUTH WITH DINNER EVERY  tablet 3     No current facility-administered medications on file prior to visit. PE:  VS:  /68   Pulse 59   Temp 96.8 °F (36 °C)   Resp 18   Ht 5' 4\" (1.626 m)   Wt 246 lb (111.6 kg)   SpO2 96%   BMI 42.23 kg/m²   General:  Alert and oriented, NAD  HEENT: Ear auricles are normal, EOMI, Conjunctivae clear  NECK:  Supple without adenopathy or thyromegaly, no carotid bruits. LUNGS:  CTA all fields  HEART:  RRR without M, R, or G  ABDOMEN:  Soft and nontender without palpable abnormalities, No renal or aortic bruits. EXTREMITIES: No ankle edema bilaterally. Neuro: No focal deficit. Lab Results   Component Value Date    WBC 8.1 01/19/2023    HGB 14.3 01/19/2023    HCT 41.4 01/19/2023     01/19/2023    CHOL 135 01/19/2023    TRIG 79 01/19/2023    HDL 58 01/19/2023    LDLCALC 61 01/19/2023    ALT 19 01/19/2023    AST 29 01/19/2023     01/19/2023    K 3.6 01/19/2023     01/19/2023    CREATININE 0.8 01/19/2023    BUN 18 01/19/2023    LABGLOM >60 01/19/2023    GFRAA >60 09/07/2022    CO2 22 01/19/2023    TSH 1.690 01/19/2023    GLUCOSE 126 (H) 01/19/2023    LABA1C 6.9 (H) 01/19/2023    LABMICR <12.0 01/19/2023    LABCREA 79 01/19/2023    MALBCR - (AA) 01/19/2023         Impression/Plan:    1. Type 2 diabetes mellitus without complication, without long-term current use of insulin (HCC)  Comments:  A1c is at goal 6.9 and fasting glucose 126 patient encouraged to increase her exercise continue to follow a diabetic diet. Continue metformin 750 mg 1 tab. bid  Orders:  -     Comprehensive Metabolic Panel; Future  -     Hemoglobin A1C; Future  -     Lipid Panel;  Future  -     CBC with Auto Differential; Future  -     Microalbumin / Creatinine Urine Ratio; Future  -     TSH; Future  2. Recurrent major depressive disorder, remission status unspecified ([facility]  Comments:  Patient states she will never take another pill and she is not suicidal and she does not want to see psychiatry I will send a referral for counseling and patien  3. Obesity, Class III, BMI 40-49.9 (morbid obesity) ([facility]  Comments:  Patient is slowly losing some weight trying to eat healthier. 4. Essential hypertension  Comments:  Blood pressure at goal patient will continue valsartan, Diovan HCT same dose. Orders:  -     Comprehensive Metabolic Panel; Future  -     Hemoglobin A1C; Future  -     Lipid Panel; Future  -     CBC with Auto Differential; Future  -     Microalbumin / Creatinine Urine Ratio; Future  -     TSH; Future  5. Mixed hyperlipidemia  Comments:  LDL at goal patient to continue atorvastatin 40 mg daily. Orders:  -     Comprehensive Metabolic Panel; Future  -     Hemoglobin A1C; Future  -     Lipid Panel; Future  -     CBC with Auto Differential; Future  -     Microalbumin / Creatinine Urine Ratio; Future  -     TSH;  Future

## 2023-02-03 ENCOUNTER — TELEPHONE (OUTPATIENT)
Dept: FAMILY MEDICINE CLINIC | Age: 68
End: 2023-02-03

## 2023-02-03 NOTE — TELEPHONE ENCOUNTER
[facility] a [facility]   Call placed to pt today and message left requesting return call at her convenience.  Samson Aragon MSW, LISW-S, OSW-C

## 2023-02-08 DIAGNOSIS — I10 ESSENTIAL HYPERTENSION: ICD-10-CM

## 2023-02-09 RX ORDER — METOPROLOL TARTRATE 50 MG/1
TABLET, FILM COATED ORAL
Qty: 180 TABLET | Refills: 1 | Status: SHIPPED | OUTPATIENT
Start: 2023-02-09

## 2023-02-20 ENCOUNTER — TELEPHONE (OUTPATIENT)
Dept: FAMILY MEDICINE CLINIC | Age: 68
End: 2023-02-20

## 2023-02-20 NOTE — TELEPHONE ENCOUNTER
[facility] received return call from pt re: referral. Pt politely declined services/intervention at this time for counseling.  Whitney Thompson, MSW, LISW-S

## 2023-02-20 NOTE — TELEPHONE ENCOUNTER
[facility] received referral for a diagnosis of agitation and stress   all 2nd call placed to pt today and message left requesting return call at her convenience.  Orlando Mcdonald MSW, LISW-S, OSW-C

## 2023-02-27 DIAGNOSIS — E66.01 OBESITY, CLASS III, BMI 40-49.9 (MORBID OBESITY) (HCC): ICD-10-CM

## 2023-02-27 DIAGNOSIS — N18.2 TYPE 2 DIABETES MELLITUS WITH STAGE 2 CHRONIC KIDNEY DISEASE, WITHOUT LONG-TERM CURRENT USE OF INSULIN (HCC): ICD-10-CM

## 2023-02-27 DIAGNOSIS — I10 ESSENTIAL HYPERTENSION: ICD-10-CM

## 2023-02-27 DIAGNOSIS — E03.9 HYPOTHYROIDISM, UNSPECIFIED TYPE: ICD-10-CM

## 2023-02-27 DIAGNOSIS — E11.22 TYPE 2 DIABETES MELLITUS WITH STAGE 2 CHRONIC KIDNEY DISEASE, WITHOUT LONG-TERM CURRENT USE OF INSULIN (HCC): ICD-10-CM

## 2023-02-27 DIAGNOSIS — N18.31 STAGE 3A CHRONIC KIDNEY DISEASE (HCC): ICD-10-CM

## 2023-02-27 DIAGNOSIS — E78.2 MIXED HYPERLIPIDEMIA: ICD-10-CM

## 2023-02-27 RX ORDER — METFORMIN HYDROCHLORIDE 750 MG/1
TABLET, EXTENDED RELEASE ORAL
Qty: 180 TABLET | Refills: 3 | Status: SHIPPED | OUTPATIENT
Start: 2023-02-27

## 2023-04-28 ENCOUNTER — NURSE ONLY (OUTPATIENT)
Dept: PRIMARY CARE CLINIC | Age: 68
End: 2023-04-28

## 2023-04-28 DIAGNOSIS — E11.9 TYPE 2 DIABETES MELLITUS WITHOUT COMPLICATION, WITHOUT LONG-TERM CURRENT USE OF INSULIN (HCC): ICD-10-CM

## 2023-04-28 DIAGNOSIS — Z01.89 ROUTINE LAB DRAW: Primary | ICD-10-CM

## 2023-04-28 DIAGNOSIS — E78.2 MIXED HYPERLIPIDEMIA: ICD-10-CM

## 2023-04-28 DIAGNOSIS — I10 ESSENTIAL HYPERTENSION: ICD-10-CM

## 2023-04-28 LAB
ALBUMIN SERPL-MCNC: 3.9 G/DL (ref 3.5–5.2)
ALP SERPL-CCNC: 98 U/L (ref 35–104)
ALT SERPL-CCNC: 20 U/L (ref 0–32)
ANION GAP SERPL CALCULATED.3IONS-SCNC: 16 MMOL/L (ref 7–16)
AST SERPL-CCNC: 28 U/L (ref 0–31)
BASOPHILS # BLD: 0.03 E9/L (ref 0–0.2)
BASOPHILS NFR BLD: 0.3 % (ref 0–2)
BILIRUB SERPL-MCNC: 0.4 MG/DL (ref 0–1.2)
BUN SERPL-MCNC: 19 MG/DL (ref 6–23)
CALCIUM SERPL-MCNC: 9.7 MG/DL (ref 8.6–10.2)
CHLORIDE SERPL-SCNC: 103 MMOL/L (ref 98–107)
CHOLESTEROL, TOTAL: 163 MG/DL (ref 0–199)
CO2 SERPL-SCNC: 22 MMOL/L (ref 22–29)
CREAT SERPL-MCNC: 0.8 MG/DL (ref 0.5–1)
CREAT UR-MCNC: 177 MG/DL (ref 29–226)
EOSINOPHIL # BLD: 0.14 E9/L (ref 0.05–0.5)
EOSINOPHIL NFR BLD: 1.3 % (ref 0–6)
ERYTHROCYTE [DISTWIDTH] IN BLOOD BY AUTOMATED COUNT: 13.2 FL (ref 11.5–15)
GLUCOSE SERPL-MCNC: 138 MG/DL (ref 74–99)
HBA1C MFR BLD: 7 % (ref 4–5.6)
HCT VFR BLD AUTO: 45.1 % (ref 34–48)
HDLC SERPL-MCNC: 56 MG/DL
HGB BLD-MCNC: 14.6 G/DL (ref 11.5–15.5)
IMM GRANULOCYTES # BLD: 0.05 E9/L
IMM GRANULOCYTES NFR BLD: 0.5 % (ref 0–5)
LDLC SERPL CALC-MCNC: 91 MG/DL (ref 0–99)
LYMPHOCYTES # BLD: 2.82 E9/L (ref 1.5–4)
LYMPHOCYTES NFR BLD: 25.6 % (ref 20–42)
MCH RBC QN AUTO: 32.4 PG (ref 26–35)
MCHC RBC AUTO-ENTMCNC: 32.4 % (ref 32–34.5)
MCV RBC AUTO: 100 FL (ref 80–99.9)
MICROALBUMIN UR-MCNC: <12 MG/L
MICROALBUMIN/CREAT UR-RTO: ABNORMAL (ref 0–30)
MONOCYTES # BLD: 1.19 E9/L (ref 0.1–0.95)
MONOCYTES NFR BLD: 10.8 % (ref 2–12)
NEUTROPHILS # BLD: 6.8 E9/L (ref 1.8–7.3)
NEUTS SEG NFR BLD: 61.5 % (ref 43–80)
PLATELET # BLD AUTO: 239 E9/L (ref 130–450)
PMV BLD AUTO: 9.7 FL (ref 7–12)
POTASSIUM SERPL-SCNC: 4.1 MMOL/L (ref 3.5–5)
PROT SERPL-MCNC: 7 G/DL (ref 6.4–8.3)
RBC # BLD AUTO: 4.51 E12/L (ref 3.5–5.5)
SODIUM SERPL-SCNC: 141 MMOL/L (ref 132–146)
TRIGL SERPL-MCNC: 81 MG/DL (ref 0–149)
TSH SERPL-MCNC: 8.35 UIU/ML (ref 0.27–4.2)
VLDLC SERPL CALC-MCNC: 16 MG/DL
WBC # BLD: 11 E9/L (ref 4.5–11.5)

## 2023-05-11 ENCOUNTER — OFFICE VISIT (OUTPATIENT)
Dept: PRIMARY CARE CLINIC | Age: 68
End: 2023-05-11

## 2023-05-11 VITALS
BODY MASS INDEX: 43.02 KG/M2 | HEART RATE: 84 BPM | RESPIRATION RATE: 18 BRPM | OXYGEN SATURATION: 98 % | TEMPERATURE: 97.9 F | HEIGHT: 64 IN | DIASTOLIC BLOOD PRESSURE: 68 MMHG | SYSTOLIC BLOOD PRESSURE: 142 MMHG | WEIGHT: 252 LBS

## 2023-05-11 DIAGNOSIS — N18.2 TYPE 2 DIABETES MELLITUS WITH STAGE 2 CHRONIC KIDNEY DISEASE, WITHOUT LONG-TERM CURRENT USE OF INSULIN (HCC): Primary | ICD-10-CM

## 2023-05-11 DIAGNOSIS — E03.9 HYPOTHYROIDISM, UNSPECIFIED TYPE: ICD-10-CM

## 2023-05-11 DIAGNOSIS — E66.01 CLASS 3 SEVERE OBESITY DUE TO EXCESS CALORIES WITH SERIOUS COMORBIDITY AND BODY MASS INDEX (BMI) OF 40.0 TO 44.9 IN ADULT (HCC): ICD-10-CM

## 2023-05-11 DIAGNOSIS — E04.9 GOITER, NODULAR: ICD-10-CM

## 2023-05-11 DIAGNOSIS — E11.22 TYPE 2 DIABETES MELLITUS WITH STAGE 2 CHRONIC KIDNEY DISEASE, WITHOUT LONG-TERM CURRENT USE OF INSULIN (HCC): Primary | ICD-10-CM

## 2023-05-11 DIAGNOSIS — E78.2 MIXED HYPERLIPIDEMIA: ICD-10-CM

## 2023-05-11 DIAGNOSIS — N18.31 STAGE 3A CHRONIC KIDNEY DISEASE (HCC): ICD-10-CM

## 2023-05-11 SDOH — ECONOMIC STABILITY: FOOD INSECURITY: WITHIN THE PAST 12 MONTHS, THE FOOD YOU BOUGHT JUST DIDN'T LAST AND YOU DIDN'T HAVE MONEY TO GET MORE.: NEVER TRUE

## 2023-05-11 SDOH — ECONOMIC STABILITY: HOUSING INSECURITY
IN THE LAST 12 MONTHS, WAS THERE A TIME WHEN YOU DID NOT HAVE A STEADY PLACE TO SLEEP OR SLEPT IN A SHELTER (INCLUDING NOW)?: NO

## 2023-05-11 SDOH — ECONOMIC STABILITY: INCOME INSECURITY: HOW HARD IS IT FOR YOU TO PAY FOR THE VERY BASICS LIKE FOOD, HOUSING, MEDICAL CARE, AND HEATING?: NOT HARD AT ALL

## 2023-05-11 SDOH — ECONOMIC STABILITY: FOOD INSECURITY: WITHIN THE PAST 12 MONTHS, YOU WORRIED THAT YOUR FOOD WOULD RUN OUT BEFORE YOU GOT MONEY TO BUY MORE.: NEVER TRUE

## 2023-05-11 ASSESSMENT — ENCOUNTER SYMPTOMS
NAUSEA: 0
CHEST TIGHTNESS: 0
ABDOMINAL PAIN: 0
SORE THROAT: 0
VOMITING: 0
SHORTNESS OF BREATH: 0
VOICE CHANGE: 0
WHEEZING: 0

## 2023-05-11 NOTE — PROGRESS NOTES
HPI:  Patient comes in today for chronic fatigue , she states she has history of thyroid nodule years ago was checked and it was benign. Patient states she takes her medication regularly. Patient states that her degree of anxiety and depression goes up and down because of her  job but she did not feel like she needs counseling he finally is getting a job in the past 2 weeks and that makes her feel better. But she feels very tired and is concerned about the abnormal result of her thyroid test.  Patient taking all her medication including metformin and the cholesterol and the blood pressure medication regularly. .    Review of Systems   Constitutional:  Negative for chills, fever and unexpected weight change. HENT:  Negative for postnasal drip, sore throat and voice change. Respiratory:  Negative for chest tightness, shortness of breath and wheezing. Cardiovascular:  Negative for chest pain and leg swelling. Gastrointestinal:  Negative for abdominal pain, nausea and vomiting. Musculoskeletal:  Negative for gait problem and joint swelling. Skin:  Negative for rash and wound. Neurological: Negative. Psychiatric/Behavioral: Negative. Past Medical History:   Diagnosis Date    Chronic kidney disease     Diabetes mellitus ([facility] [address]     Hearing loss     Hypothyroidism     Obesity     Osteoarthritis     Thoracic and lumbosacral neuritis     Trochanteric bursitis      Past Surgical History:   Procedure Laterality Date    CYST REMOVAL      ECTOPIC PREGNANCY SURGERY       No family history on file.    Social History     Tobacco Use    Smoking status: Former     Packs/day: 0.25     Years: 10.00     Pack years: 2.50     Types: Cigarettes     Quit date: 1981     Years since quittin.3    Smokeless tobacco: Never   Substance Use Topics    Alcohol use: Never    Drug use: Never        Current Outpatient Medications on File Prior to Visit   Medication Sig Dispense Refill    metFORMIN

## 2023-05-12 LAB
T4 FREE SERPL-MCNC: 1.53 NG/DL (ref 0.93–1.7)
TSH SERPL-MCNC: 5.12 UIU/ML (ref 0.27–4.2)

## 2023-05-18 ENCOUNTER — HOSPITAL ENCOUNTER (OUTPATIENT)
Dept: ULTRASOUND IMAGING | Age: 68
Discharge: HOME | End: 2023-05-20
Payer: MEDICARE

## 2023-05-18 DIAGNOSIS — E04.9 GOITER, NODULAR: ICD-10-CM

## 2023-05-18 PROCEDURE — 76536 US EXAM OF HEAD AND NECK: CPT

## 2023-06-28 ENCOUNTER — OFFICE VISIT (OUTPATIENT)
Dept: PRIMARY CARE CLINIC | Age: 68
End: 2023-06-28
Payer: MEDICARE

## 2023-06-28 VITALS
SYSTOLIC BLOOD PRESSURE: 130 MMHG | WEIGHT: 249 LBS | HEART RATE: 57 BPM | BODY MASS INDEX: 42.51 KG/M2 | DIASTOLIC BLOOD PRESSURE: 62 MMHG | OXYGEN SATURATION: 97 % | HEIGHT: 64 IN | RESPIRATION RATE: 18 BRPM | TEMPERATURE: 97 F

## 2023-06-28 DIAGNOSIS — E78.2 MIXED HYPERLIPIDEMIA: ICD-10-CM

## 2023-06-28 DIAGNOSIS — R35.0 FREQUENCY OF MICTURITION: ICD-10-CM

## 2023-06-28 DIAGNOSIS — N18.2 TYPE 2 DIABETES MELLITUS WITH STAGE 2 CHRONIC KIDNEY DISEASE, WITHOUT LONG-TERM CURRENT USE OF INSULIN (HCC): Primary | ICD-10-CM

## 2023-06-28 DIAGNOSIS — Z11.59 NEED FOR HEPATITIS C SCREENING TEST: ICD-10-CM

## 2023-06-28 DIAGNOSIS — E11.22 TYPE 2 DIABETES MELLITUS WITH STAGE 2 CHRONIC KIDNEY DISEASE, WITHOUT LONG-TERM CURRENT USE OF INSULIN (HCC): Primary | ICD-10-CM

## 2023-06-28 DIAGNOSIS — Z12.31 BREAST CANCER SCREENING BY MAMMOGRAM: ICD-10-CM

## 2023-06-28 DIAGNOSIS — E03.9 HYPOTHYROIDISM, UNSPECIFIED TYPE: ICD-10-CM

## 2023-06-28 PROBLEM — N18.31 STAGE 3A CHRONIC KIDNEY DISEASE (HCC): Status: RESOLVED | Noted: 2023-05-11 | Resolved: 2023-06-28

## 2023-06-28 PROCEDURE — 1090F PRES/ABSN URINE INCON ASSESS: CPT | Performed by: INTERNAL MEDICINE

## 2023-06-28 PROCEDURE — G8400 PT W/DXA NO RESULTS DOC: HCPCS | Performed by: INTERNAL MEDICINE

## 2023-06-28 PROCEDURE — 3051F HG A1C>EQUAL 7.0%<8.0%: CPT | Performed by: INTERNAL MEDICINE

## 2023-06-28 PROCEDURE — 2022F DILAT RTA XM EVC RTNOPTHY: CPT | Performed by: INTERNAL MEDICINE

## 2023-06-28 PROCEDURE — 99214 OFFICE O/P EST MOD 30 MIN: CPT | Performed by: INTERNAL MEDICINE

## 2023-06-28 PROCEDURE — 3074F SYST BP LT 130 MM HG: CPT | Performed by: INTERNAL MEDICINE

## 2023-06-28 PROCEDURE — 3017F COLORECTAL CA SCREEN DOC REV: CPT | Performed by: INTERNAL MEDICINE

## 2023-06-28 PROCEDURE — 1036F TOBACCO NON-USER: CPT | Performed by: INTERNAL MEDICINE

## 2023-06-28 PROCEDURE — 3078F DIAST BP <80 MM HG: CPT | Performed by: INTERNAL MEDICINE

## 2023-06-28 PROCEDURE — G8427 DOCREV CUR MEDS BY ELIG CLIN: HCPCS | Performed by: INTERNAL MEDICINE

## 2023-06-28 PROCEDURE — G8417 CALC BMI ABV UP PARAM F/U: HCPCS | Performed by: INTERNAL MEDICINE

## 2023-06-28 PROCEDURE — 1123F ACP DISCUSS/DSCN MKR DOCD: CPT | Performed by: INTERNAL MEDICINE

## 2023-06-28 RX ORDER — VALSARTAN AND HYDROCHLOROTHIAZIDE 320; 12.5 MG/1; MG/1
1 TABLET, FILM COATED ORAL DAILY
Qty: 90 TABLET | Refills: 3 | Status: SHIPPED | OUTPATIENT
Start: 2023-06-28

## 2023-06-28 RX ORDER — LEVOTHYROXINE SODIUM 112 UG/1
112 TABLET ORAL DAILY
Qty: 90 TABLET | Refills: 1 | Status: SHIPPED | OUTPATIENT
Start: 2023-06-28

## 2023-07-19 DIAGNOSIS — Z12.31 BREAST CANCER SCREENING BY MAMMOGRAM: ICD-10-CM

## 2023-07-26 ENCOUNTER — HOSPITAL ENCOUNTER (EMERGENCY)
Age: 68
Discharge: HOME | End: 2023-07-27
Payer: MEDICARE

## 2023-07-26 DIAGNOSIS — H11.32 SUBCONJUNCTIVAL HEMORRHAGE OF LEFT EYE: Primary | ICD-10-CM

## 2023-07-26 PROCEDURE — 6370000000 HC RX 637 (ALT 250 FOR IP)

## 2023-07-26 PROCEDURE — 99283 EMERGENCY DEPT VISIT LOW MDM: CPT

## 2023-07-26 RX ORDER — TETRACAINE HYDROCHLORIDE 5 MG/ML
1 SOLUTION OPHTHALMIC ONCE
Status: COMPLETED | OUTPATIENT
Start: 2023-07-26 | End: 2023-07-26

## 2023-07-26 RX ADMIN — TETRACAINE HYDROCHLORIDE 1 DROP: 5 SOLUTION OPHTHALMIC at 23:59

## 2023-07-26 RX ADMIN — FLUORESCEIN SODIUM 1 MG: 1 STRIP OPHTHALMIC at 23:59

## 2023-07-26 ASSESSMENT — PAIN - FUNCTIONAL ASSESSMENT: PAIN_FUNCTIONAL_ASSESSMENT: 0-10

## 2023-07-26 ASSESSMENT — PAIN DESCRIPTION - DESCRIPTORS: DESCRIPTORS: PRESSURE

## 2023-07-26 ASSESSMENT — PAIN DESCRIPTION - ORIENTATION: ORIENTATION: LEFT

## 2023-07-26 ASSESSMENT — PAIN DESCRIPTION - LOCATION: LOCATION: EYE

## 2023-07-27 VITALS
OXYGEN SATURATION: 100 % | SYSTOLIC BLOOD PRESSURE: 135 MMHG | HEIGHT: 64 IN | BODY MASS INDEX: 40.97 KG/M2 | HEART RATE: 89 BPM | RESPIRATION RATE: 16 BRPM | WEIGHT: 240 LBS | TEMPERATURE: 98.7 F | DIASTOLIC BLOOD PRESSURE: 74 MMHG

## 2023-07-27 ASSESSMENT — PAIN - FUNCTIONAL ASSESSMENT: PAIN_FUNCTIONAL_ASSESSMENT: NONE - DENIES PAIN

## 2023-07-27 NOTE — ED PROVIDER NOTES
Independent [facility] Visit. [facility]  Department of Emergency Medicine   ED  Encounter Note  Admit Date/RoomTime: 2023 11:23 PM  ED Room: 35/35    NAME: Lindy Frances  : 1955  MRN: [id number]     Chief Complaint:  Eye Pain (Redness to left eye, no injury, no blood thinner use. No drainage from left eye. )    History of Present Illness        Keyshawn Perry is a 77 y.o. old female presenting to the emergency department by private vehicle, for non-traumatic persistent erythema to left eye, which began 3 day(s) prior to arrival.  There has been no obvious mechanism causing complaint. Since onset her symptoms have been persistent and mild in severity. Associated signs & symptoms of: nothing additional. The patients tetanus status is up to date. Patient stated that she noticed a couple days ago brought to the left eye which she believed was a subconjunctival hemorrhage which she has had in the past.  Patient states that she has been straining to have bowel movements, and not sure if she broke a blood vessel. She states that her  noticed that today, and wanted her to be evaluated. She denies any pain. Denies any headache, dizziness or vision changes. Denies any numbness, weakness or paresthesias. No chest pain or shortness of breath. No other complaints concerns at this time. Patient does wear glasses. History of:     []   Glaucoma     []   Recent Eye Surgery     ROS   Pertinent positives and negatives are stated within HPI, all other systems reviewed and are negative. Past Medical History:  has a past medical history of Chronic kidney disease, Diabetes mellitus ([address]), Hearing loss, Hypothyroidism, Obesity, Osteoarthritis, Thoracic and lumbosacral neuritis, and Trochanteric bursitis. Surgical History:  has a past surgical history that includes Ectopic pregnancy surgery and cyst removal.    Social History:  reports that she quit smoking about 42 years ago.  Her

## 2023-08-06 DIAGNOSIS — I10 ESSENTIAL HYPERTENSION: ICD-10-CM

## 2023-08-06 RX ORDER — METOPROLOL TARTRATE 50 MG/1
TABLET, FILM COATED ORAL
Qty: 180 TABLET | Refills: 1 | Status: CANCELLED | OUTPATIENT
Start: 2023-08-06

## 2023-08-08 ENCOUNTER — OFFICE VISIT (OUTPATIENT)
Dept: PRIMARY CARE CLINIC | Age: 68
End: 2023-08-08
Payer: MEDICARE

## 2023-08-08 VITALS
RESPIRATION RATE: 18 BRPM | HEIGHT: 64 IN | BODY MASS INDEX: 42.68 KG/M2 | WEIGHT: 250 LBS | SYSTOLIC BLOOD PRESSURE: 152 MMHG | DIASTOLIC BLOOD PRESSURE: 80 MMHG | OXYGEN SATURATION: 96 % | HEART RATE: 57 BPM | TEMPERATURE: 96.9 F

## 2023-08-08 DIAGNOSIS — N95.9 MENOPAUSAL DISORDER: ICD-10-CM

## 2023-08-08 DIAGNOSIS — I10 ESSENTIAL HYPERTENSION: ICD-10-CM

## 2023-08-08 DIAGNOSIS — E66.01 OBESITY, CLASS III, BMI 40-49.9 (MORBID OBESITY) (HCC): ICD-10-CM

## 2023-08-08 DIAGNOSIS — I10 UNCONTROLLED HYPERTENSION: ICD-10-CM

## 2023-08-08 DIAGNOSIS — M81.0 POSTMENOPAUSAL BONE LOSS: ICD-10-CM

## 2023-08-08 DIAGNOSIS — E78.2 MIXED HYPERLIPIDEMIA: ICD-10-CM

## 2023-08-08 DIAGNOSIS — E11.22 TYPE 2 DIABETES MELLITUS WITH STAGE 2 CHRONIC KIDNEY DISEASE, WITHOUT LONG-TERM CURRENT USE OF INSULIN (HCC): Primary | ICD-10-CM

## 2023-08-08 DIAGNOSIS — E03.9 HYPOTHYROIDISM, UNSPECIFIED TYPE: ICD-10-CM

## 2023-08-08 DIAGNOSIS — N18.2 TYPE 2 DIABETES MELLITUS WITH STAGE 2 CHRONIC KIDNEY DISEASE, WITHOUT LONG-TERM CURRENT USE OF INSULIN (HCC): Primary | ICD-10-CM

## 2023-08-08 DIAGNOSIS — R00.1 BRADYCARDIA: ICD-10-CM

## 2023-08-08 DIAGNOSIS — H11.32 SUBCONJUNCTIVAL HEMORRHAGE OF LEFT EYE: ICD-10-CM

## 2023-08-08 PROCEDURE — 1123F ACP DISCUSS/DSCN MKR DOCD: CPT | Performed by: INTERNAL MEDICINE

## 2023-08-08 PROCEDURE — 3079F DIAST BP 80-89 MM HG: CPT | Performed by: INTERNAL MEDICINE

## 2023-08-08 PROCEDURE — G8427 DOCREV CUR MEDS BY ELIG CLIN: HCPCS | Performed by: INTERNAL MEDICINE

## 2023-08-08 PROCEDURE — 3017F COLORECTAL CA SCREEN DOC REV: CPT | Performed by: INTERNAL MEDICINE

## 2023-08-08 PROCEDURE — 2022F DILAT RTA XM EVC RTNOPTHY: CPT | Performed by: INTERNAL MEDICINE

## 2023-08-08 PROCEDURE — 3051F HG A1C>EQUAL 7.0%<8.0%: CPT | Performed by: INTERNAL MEDICINE

## 2023-08-08 PROCEDURE — 93000 ELECTROCARDIOGRAM COMPLETE: CPT | Performed by: INTERNAL MEDICINE

## 2023-08-08 PROCEDURE — G8400 PT W/DXA NO RESULTS DOC: HCPCS | Performed by: INTERNAL MEDICINE

## 2023-08-08 PROCEDURE — 1090F PRES/ABSN URINE INCON ASSESS: CPT | Performed by: INTERNAL MEDICINE

## 2023-08-08 PROCEDURE — 3077F SYST BP >= 140 MM HG: CPT | Performed by: INTERNAL MEDICINE

## 2023-08-08 PROCEDURE — 1036F TOBACCO NON-USER: CPT | Performed by: INTERNAL MEDICINE

## 2023-08-08 PROCEDURE — 99214 OFFICE O/P EST MOD 30 MIN: CPT | Performed by: INTERNAL MEDICINE

## 2023-08-08 PROCEDURE — G8417 CALC BMI ABV UP PARAM F/U: HCPCS | Performed by: INTERNAL MEDICINE

## 2023-08-08 RX ORDER — METOPROLOL TARTRATE 50 MG/1
TABLET, FILM COATED ORAL
Qty: 180 TABLET | Refills: 1 | Status: SHIPPED | OUTPATIENT
Start: 2023-08-08

## 2023-08-08 RX ORDER — HYDROCHLOROTHIAZIDE 25 MG/1
12.5 TABLET ORAL EVERY MORNING
Qty: 45 TABLET | Refills: 5 | Status: SHIPPED | OUTPATIENT
Start: 2023-08-08

## 2023-08-08 ASSESSMENT — ENCOUNTER SYMPTOMS
WHEEZING: 0
VOMITING: 0
CHEST TIGHTNESS: 0
SHORTNESS OF BREATH: 0
NAUSEA: 0
ABDOMINAL PAIN: 0
VOICE CHANGE: 0
SORE THROAT: 0
EYE REDNESS: 1

## 2023-09-22 ENCOUNTER — NURSE ONLY (OUTPATIENT)
Dept: PRIMARY CARE CLINIC | Age: 68
End: 2023-09-22

## 2023-09-22 DIAGNOSIS — N18.2 TYPE 2 DIABETES MELLITUS WITH STAGE 2 CHRONIC KIDNEY DISEASE, WITHOUT LONG-TERM CURRENT USE OF INSULIN (HCC): ICD-10-CM

## 2023-09-22 DIAGNOSIS — E78.2 MIXED HYPERLIPIDEMIA: ICD-10-CM

## 2023-09-22 DIAGNOSIS — Z01.89 ROUTINE LAB DRAW: Primary | ICD-10-CM

## 2023-09-22 DIAGNOSIS — Z11.59 NEED FOR HEPATITIS C SCREENING TEST: ICD-10-CM

## 2023-09-22 DIAGNOSIS — E03.9 HYPOTHYROIDISM, UNSPECIFIED TYPE: ICD-10-CM

## 2023-09-22 DIAGNOSIS — E11.22 TYPE 2 DIABETES MELLITUS WITH STAGE 2 CHRONIC KIDNEY DISEASE, WITHOUT LONG-TERM CURRENT USE OF INSULIN (HCC): ICD-10-CM

## 2023-09-22 LAB
ABSOLUTE IMMATURE GRANULOCYTE: 0.03 K/UL (ref 0–0.58)
ALBUMIN SERPL-MCNC: 4.1 G/DL (ref 3.5–5.2)
ALP BLD-CCNC: 84 U/L (ref 35–104)
ALT SERPL-CCNC: 23 U/L (ref 0–32)
ANION GAP SERPL CALCULATED.3IONS-SCNC: 13 MMOL/L (ref 7–16)
AST SERPL-CCNC: 32 U/L (ref 0–31)
BASOPHILS ABSOLUTE: 0.03 K/UL (ref 0–0.2)
BASOPHILS RELATIVE PERCENT: 0 % (ref 0–2)
BILIRUB SERPL-MCNC: 0.5 MG/DL (ref 0–1.2)
BUN BLDV-MCNC: 27 MG/DL (ref 6–23)
CALCIUM SERPL-MCNC: 9.5 MG/DL (ref 8.6–10.2)
CHLORIDE BLD-SCNC: 105 MMOL/L (ref 98–107)
CHOLESTEROL: 149 MG/DL
CO2: 23 MMOL/L (ref 22–29)
CREAT SERPL-MCNC: 1 MG/DL (ref 0.5–1)
EOSINOPHILS ABSOLUTE: 0.09 K/UL (ref 0.05–0.5)
EOSINOPHILS RELATIVE PERCENT: 1 % (ref 0–6)
GFR SERPL CREATININE-BSD FRML MDRD: >60 ML/MIN/1.73M2
GLUCOSE BLD-MCNC: 119 MG/DL (ref 74–99)
HBA1C MFR BLD: 6.9 % (ref 4–5.6)
HCT VFR BLD CALC: 44.5 % (ref 34–48)
HDLC SERPL-MCNC: 50 MG/DL
HEMOGLOBIN: 14.8 G/DL (ref 11.5–15.5)
IMMATURE GRANULOCYTES: 0 % (ref 0–5)
LDL CHOLESTEROL: 80 MG/DL
LYMPHOCYTES ABSOLUTE: 2.96 K/UL (ref 1.5–4)
LYMPHOCYTES RELATIVE PERCENT: 29 % (ref 20–42)
MCH RBC QN AUTO: 32.2 PG (ref 26–35)
MCHC RBC AUTO-ENTMCNC: 33.3 G/DL (ref 32–34.5)
MCV RBC AUTO: 96.9 FL (ref 80–99.9)
MONOCYTES ABSOLUTE: 1.19 K/UL (ref 0.1–0.95)
MONOCYTES RELATIVE PERCENT: 12 % (ref 2–12)
NEUTROPHILS ABSOLUTE: 5.84 K/UL (ref 1.8–7.3)
NEUTROPHILS RELATIVE PERCENT: 58 % (ref 43–80)
PDW BLD-RTO: 13.2 % (ref 11.5–15)
PLATELET # BLD: 254 K/UL (ref 130–450)
PMV BLD AUTO: 10.1 FL (ref 7–12)
POTASSIUM SERPL-SCNC: 4.2 MMOL/L (ref 3.5–5)
RBC # BLD: 4.59 M/UL (ref 3.5–5.5)
SODIUM BLD-SCNC: 141 MMOL/L (ref 132–146)
TOTAL PROTEIN: 7.4 G/DL (ref 6.4–8.3)
TRIGL SERPL-MCNC: 95 MG/DL
TSH SERPL DL<=0.05 MIU/L-ACNC: 5.35 UIU/ML (ref 0.27–4.2)
VLDLC SERPL CALC-MCNC: 19 MG/DL
WBC # BLD: 10.1 K/UL (ref 4.5–11.5)

## 2023-09-25 LAB — HEPATITIS C ANTIBODY: NONREACTIVE

## 2023-10-03 SDOH — HEALTH STABILITY: PHYSICAL HEALTH: ON AVERAGE, HOW MANY DAYS PER WEEK DO YOU ENGAGE IN MODERATE TO STRENUOUS EXERCISE (LIKE A BRISK WALK)?: 3 DAYS

## 2023-10-03 SDOH — HEALTH STABILITY: PHYSICAL HEALTH: ON AVERAGE, HOW MANY MINUTES DO YOU ENGAGE IN EXERCISE AT THIS LEVEL?: 30 MIN

## 2023-10-03 ASSESSMENT — LIFESTYLE VARIABLES
HOW MANY STANDARD DRINKS CONTAINING ALCOHOL DO YOU HAVE ON A TYPICAL DAY: PATIENT DOES NOT DRINK
HOW OFTEN DO YOU HAVE A DRINK CONTAINING ALCOHOL: 1
HOW OFTEN DO YOU HAVE SIX OR MORE DRINKS ON ONE OCCASION: 1
HOW MANY STANDARD DRINKS CONTAINING ALCOHOL DO YOU HAVE ON A TYPICAL DAY: 0
HOW OFTEN DO YOU HAVE A DRINK CONTAINING ALCOHOL: NEVER

## 2023-10-03 ASSESSMENT — PATIENT HEALTH QUESTIONNAIRE - PHQ9
1. LITTLE INTEREST OR PLEASURE IN DOING THINGS: 0
SUM OF ALL RESPONSES TO PHQ QUESTIONS 1-9: 0
SUM OF ALL RESPONSES TO PHQ QUESTIONS 1-9: 0
SUM OF ALL RESPONSES TO PHQ9 QUESTIONS 1 & 2: 0
SUM OF ALL RESPONSES TO PHQ QUESTIONS 1-9: 0
SUM OF ALL RESPONSES TO PHQ QUESTIONS 1-9: 0
2. FEELING DOWN, DEPRESSED OR HOPELESS: 0

## 2023-10-04 ENCOUNTER — OFFICE VISIT (OUTPATIENT)
Dept: PRIMARY CARE CLINIC | Age: 68
End: 2023-10-04
Payer: MEDICARE

## 2023-10-04 VITALS
TEMPERATURE: 96.9 F | DIASTOLIC BLOOD PRESSURE: 70 MMHG | WEIGHT: 249 LBS | SYSTOLIC BLOOD PRESSURE: 142 MMHG | HEIGHT: 64 IN | OXYGEN SATURATION: 97 % | HEART RATE: 64 BPM | BODY MASS INDEX: 42.51 KG/M2

## 2023-10-04 VITALS
HEIGHT: 64 IN | DIASTOLIC BLOOD PRESSURE: 70 MMHG | TEMPERATURE: 96.9 F | WEIGHT: 249 LBS | BODY MASS INDEX: 42.51 KG/M2 | SYSTOLIC BLOOD PRESSURE: 142 MMHG | OXYGEN SATURATION: 97 % | HEART RATE: 64 BPM

## 2023-10-04 DIAGNOSIS — E11.22 TYPE 2 DIABETES MELLITUS WITH STAGE 2 CHRONIC KIDNEY DISEASE, WITHOUT LONG-TERM CURRENT USE OF INSULIN (HCC): Primary | ICD-10-CM

## 2023-10-04 DIAGNOSIS — M17.12 OSTEOARTHRITIS OF LEFT KNEE, UNSPECIFIED OSTEOARTHRITIS TYPE: ICD-10-CM

## 2023-10-04 DIAGNOSIS — M79.602 PAIN IN LEFT ARM: ICD-10-CM

## 2023-10-04 DIAGNOSIS — E03.9 HYPOTHYROIDISM, UNSPECIFIED TYPE: ICD-10-CM

## 2023-10-04 DIAGNOSIS — Z00.00 MEDICARE ANNUAL WELLNESS VISIT, SUBSEQUENT: Primary | ICD-10-CM

## 2023-10-04 DIAGNOSIS — I10 UNCONTROLLED HYPERTENSION: ICD-10-CM

## 2023-10-04 DIAGNOSIS — E66.01 CLASS 3 SEVERE OBESITY DUE TO EXCESS CALORIES WITH SERIOUS COMORBIDITY AND BODY MASS INDEX (BMI) OF 40.0 TO 44.9 IN ADULT (HCC): ICD-10-CM

## 2023-10-04 DIAGNOSIS — H91.93 HEARING DEFICIT, BILATERAL: ICD-10-CM

## 2023-10-04 DIAGNOSIS — E78.2 MIXED HYPERLIPIDEMIA: ICD-10-CM

## 2023-10-04 DIAGNOSIS — N18.2 TYPE 2 DIABETES MELLITUS WITH STAGE 2 CHRONIC KIDNEY DISEASE, WITHOUT LONG-TERM CURRENT USE OF INSULIN (HCC): Primary | ICD-10-CM

## 2023-10-04 DIAGNOSIS — E11.65 TYPE 2 DIABETES MELLITUS WITH HYPERGLYCEMIA, WITHOUT LONG-TERM CURRENT USE OF INSULIN (HCC): ICD-10-CM

## 2023-10-04 DIAGNOSIS — N18.2 TYPE 2 DIABETES MELLITUS WITH STAGE 2 CHRONIC KIDNEY DISEASE, WITHOUT LONG-TERM CURRENT USE OF INSULIN (HCC): ICD-10-CM

## 2023-10-04 DIAGNOSIS — E11.22 TYPE 2 DIABETES MELLITUS WITH STAGE 2 CHRONIC KIDNEY DISEASE, WITHOUT LONG-TERM CURRENT USE OF INSULIN (HCC): ICD-10-CM

## 2023-10-04 DIAGNOSIS — M17.0 PRIMARY OSTEOARTHRITIS OF BOTH KNEES: ICD-10-CM

## 2023-10-04 DIAGNOSIS — M75.52 BURSITIS OF LEFT SHOULDER: ICD-10-CM

## 2023-10-04 LAB
CREATININE URINE: 48.6 MG/DL (ref 29–226)
MICROALBUMIN/CREAT 24H UR: <12 MG/L (ref 0–19)
MICROALBUMIN/CREAT UR-RTO: NORMAL MCG/MG CREAT (ref 0–30)

## 2023-10-04 PROCEDURE — 1123F ACP DISCUSS/DSCN MKR DOCD: CPT | Performed by: INTERNAL MEDICINE

## 2023-10-04 PROCEDURE — 3017F COLORECTAL CA SCREEN DOC REV: CPT | Performed by: INTERNAL MEDICINE

## 2023-10-04 PROCEDURE — 1036F TOBACCO NON-USER: CPT | Performed by: INTERNAL MEDICINE

## 2023-10-04 PROCEDURE — 3078F DIAST BP <80 MM HG: CPT | Performed by: INTERNAL MEDICINE

## 2023-10-04 PROCEDURE — G8400 PT W/DXA NO RESULTS DOC: HCPCS | Performed by: INTERNAL MEDICINE

## 2023-10-04 PROCEDURE — G8484 FLU IMMUNIZE NO ADMIN: HCPCS | Performed by: INTERNAL MEDICINE

## 2023-10-04 PROCEDURE — 99214 OFFICE O/P EST MOD 30 MIN: CPT | Performed by: INTERNAL MEDICINE

## 2023-10-04 PROCEDURE — 3044F HG A1C LEVEL LT 7.0%: CPT | Performed by: INTERNAL MEDICINE

## 2023-10-04 PROCEDURE — 3074F SYST BP LT 130 MM HG: CPT | Performed by: INTERNAL MEDICINE

## 2023-10-04 PROCEDURE — G8427 DOCREV CUR MEDS BY ELIG CLIN: HCPCS | Performed by: INTERNAL MEDICINE

## 2023-10-04 PROCEDURE — 2022F DILAT RTA XM EVC RTNOPTHY: CPT | Performed by: INTERNAL MEDICINE

## 2023-10-04 PROCEDURE — G8417 CALC BMI ABV UP PARAM F/U: HCPCS | Performed by: INTERNAL MEDICINE

## 2023-10-04 PROCEDURE — 1090F PRES/ABSN URINE INCON ASSESS: CPT | Performed by: INTERNAL MEDICINE

## 2023-10-04 PROCEDURE — G0439 PPPS, SUBSEQ VISIT: HCPCS | Performed by: INTERNAL MEDICINE

## 2023-10-04 PROCEDURE — 3077F SYST BP >= 140 MM HG: CPT | Performed by: INTERNAL MEDICINE

## 2023-10-04 RX ORDER — LEVOTHYROXINE SODIUM 125 MCG
125 TABLET ORAL DAILY
Qty: 56 TABLET | Refills: 0 | Status: SHIPPED | COMMUNITY
Start: 2023-10-04

## 2023-10-04 ASSESSMENT — PATIENT HEALTH QUESTIONNAIRE - PHQ9
5. POOR APPETITE OR OVEREATING: 0
8. MOVING OR SPEAKING SO SLOWLY THAT OTHER PEOPLE COULD HAVE NOTICED. OR THE OPPOSITE, BEING SO FIGETY OR RESTLESS THAT YOU HAVE BEEN MOVING AROUND A LOT MORE THAN USUAL: 0
4. FEELING TIRED OR HAVING LITTLE ENERGY: 1
7. TROUBLE CONCENTRATING ON THINGS, SUCH AS READING THE NEWSPAPER OR WATCHING TELEVISION: 1
9. THOUGHTS THAT YOU WOULD BE BETTER OFF DEAD, OR OF HURTING YOURSELF: 0
SUM OF ALL RESPONSES TO PHQ QUESTIONS 1-9: 2
2. FEELING DOWN, DEPRESSED OR HOPELESS: 0
SUM OF ALL RESPONSES TO PHQ QUESTIONS 1-9: 2
1. LITTLE INTEREST OR PLEASURE IN DOING THINGS: 0
10. IF YOU CHECKED OFF ANY PROBLEMS, HOW DIFFICULT HAVE THESE PROBLEMS MADE IT FOR YOU TO DO YOUR WORK, TAKE CARE OF THINGS AT HOME, OR GET ALONG WITH OTHER PEOPLE: 0
6. FEELING BAD ABOUT YOURSELF - OR THAT YOU ARE A FAILURE OR HAVE LET YOURSELF OR YOUR FAMILY DOWN: 0
SUM OF ALL RESPONSES TO PHQ9 QUESTIONS 1 & 2: 0
3. TROUBLE FALLING OR STAYING ASLEEP: 0
SUM OF ALL RESPONSES TO PHQ QUESTIONS 1-9: 2
SUM OF ALL RESPONSES TO PHQ QUESTIONS 1-9: 2

## 2023-10-04 ASSESSMENT — LIFESTYLE VARIABLES
HOW OFTEN DO YOU HAVE A DRINK CONTAINING ALCOHOL: NEVER
HOW MANY STANDARD DRINKS CONTAINING ALCOHOL DO YOU HAVE ON A TYPICAL DAY: PATIENT DOES NOT DRINK

## 2023-10-04 ASSESSMENT — ENCOUNTER SYMPTOMS
VOMITING: 0
SHORTNESS OF BREATH: 0
CHEST TIGHTNESS: 0
VOICE CHANGE: 0
ABDOMINAL PAIN: 0
SORE THROAT: 0
NAUSEA: 0
WHEEZING: 0

## 2023-10-04 NOTE — PROGRESS NOTES
HPI:  Patient comes in today for complaint of pain in the left KNee > right,she is very troubled about hearing loss\" hearing aids are not helping\". Review of Systems   Constitutional:  Negative for chills, fever and unexpected weight change. HENT:  Negative for postnasal drip, sore throat and voice change. Respiratory:  Negative for chest tightness, shortness of breath and wheezing. Cardiovascular:  Negative for chest pain and leg swelling. Gastrointestinal:  Negative for abdominal pain, nausea and vomiting. Musculoskeletal:  Positive for arthralgias, gait problem and joint swelling. Skin:  Negative for rash and wound. Psychiatric/Behavioral: Negative. Past Medical History:   Diagnosis Date    Chronic kidney disease     Diabetes mellitus ([address])     Hearing loss     Hypothyroidism     Obesity     Osteoarthritis     Thoracic and lumbosacral neuritis     Trochanteric bursitis      Past Surgical History:   Procedure Laterality Date    CYST REMOVAL      ECTOPIC PREGNANCY SURGERY       No family history on file.    Social History     Tobacco Use    Smoking status: Former     Packs/day: 0.25     Years: 10.00     Additional pack years: 0.00     Total pack years: 2.50     Types: Cigarettes     Quit date: 1981     Years since quittin.7    Smokeless tobacco: Never   Substance Use Topics    Alcohol use: Never    Drug use: Never        Current Outpatient Medications on File Prior to Visit   Medication Sig Dispense Refill    metoprolol tartrate (LOPRESSOR) 50 MG tablet 1 tab  tablet 1    hydroCHLOROthiazide (HYDRODIURIL) 25 MG tablet Take 0.5 tablets by mouth every morning 45 tablet 5    valsartan-hydroCHLOROthiazide (DIOVAN-HCT) 320-12.5 MG per tablet Take 1 tablet by mouth daily 90 tablet 3    metFORMIN (GLUCOPHAGE-XR) 750 MG extended release tablet TAKE 1 TABLET BY MOUTH WITH BREAKFAST AND 1 TABLET BY MOUTH WITH DINNER EVERY  tablet 3    atorvastatin (LIPITOR) 40 MG tablet Take

## 2023-10-10 RX ORDER — ATORVASTATIN CALCIUM 40 MG/1
40 TABLET, FILM COATED ORAL DAILY
Qty: 90 TABLET | Refills: 6 | Status: SHIPPED | OUTPATIENT
Start: 2023-10-10

## 2023-10-20 SDOH — HEALTH STABILITY: PHYSICAL HEALTH: ON AVERAGE, HOW MANY DAYS PER WEEK DO YOU ENGAGE IN MODERATE TO STRENUOUS EXERCISE (LIKE A BRISK WALK)?: 0 DAYS

## 2023-10-23 ENCOUNTER — OFFICE VISIT (OUTPATIENT)
Dept: ORTHOPEDIC SURGERY | Age: 68
End: 2023-10-23
Payer: MEDICARE

## 2023-10-23 ENCOUNTER — PATIENT MESSAGE (OUTPATIENT)
Dept: PRIMARY CARE CLINIC | Age: 68
End: 2023-10-23

## 2023-10-23 VITALS — HEIGHT: 64 IN | WEIGHT: 249 LBS | BODY MASS INDEX: 42.51 KG/M2

## 2023-10-23 DIAGNOSIS — M25.562 CHRONIC PAIN OF LEFT KNEE: Primary | ICD-10-CM

## 2023-10-23 DIAGNOSIS — M25.561 CHRONIC PAIN OF RIGHT KNEE: ICD-10-CM

## 2023-10-23 DIAGNOSIS — M17.0 PRIMARY OSTEOARTHRITIS OF BOTH KNEES: ICD-10-CM

## 2023-10-23 DIAGNOSIS — G89.29 CHRONIC PAIN OF LEFT KNEE: Primary | ICD-10-CM

## 2023-10-23 DIAGNOSIS — H91.93 HEARING DEFICIT, BILATERAL: Primary | ICD-10-CM

## 2023-10-23 DIAGNOSIS — G89.29 CHRONIC PAIN OF RIGHT KNEE: ICD-10-CM

## 2023-10-23 PROCEDURE — G8427 DOCREV CUR MEDS BY ELIG CLIN: HCPCS | Performed by: FAMILY MEDICINE

## 2023-10-23 PROCEDURE — 1090F PRES/ABSN URINE INCON ASSESS: CPT | Performed by: FAMILY MEDICINE

## 2023-10-23 PROCEDURE — 99203 OFFICE O/P NEW LOW 30 MIN: CPT | Performed by: FAMILY MEDICINE

## 2023-10-23 PROCEDURE — 1123F ACP DISCUSS/DSCN MKR DOCD: CPT | Performed by: FAMILY MEDICINE

## 2023-10-23 PROCEDURE — 1036F TOBACCO NON-USER: CPT | Performed by: FAMILY MEDICINE

## 2023-10-23 PROCEDURE — G8484 FLU IMMUNIZE NO ADMIN: HCPCS | Performed by: FAMILY MEDICINE

## 2023-10-23 PROCEDURE — G8400 PT W/DXA NO RESULTS DOC: HCPCS | Performed by: FAMILY MEDICINE

## 2023-10-23 PROCEDURE — 3017F COLORECTAL CA SCREEN DOC REV: CPT | Performed by: FAMILY MEDICINE

## 2023-10-23 PROCEDURE — G8417 CALC BMI ABV UP PARAM F/U: HCPCS | Performed by: FAMILY MEDICINE

## 2023-10-30 NOTE — PROGRESS NOTES
[facility]  PRIMARY CARE SPORTS MEDICINE  DATE OF VISIT : 10/23/2023    Patient : Enrique Nagel  Age : 77 y.o.  : 1955  MRN : [id number]   ______________________________________________________________________    Chief Complaint :   Chief Complaint   Patient presents with    Knee Pain     Knee pain right and left    had PT just a month ago started taking motrin  and  going to do surgery if she loses 40 lbs  so sent to get injections but feels that she thing that since bone on bone and thought gel would be better        HPI : Enrique Nagel is 77 y.o. female who presented to the clinic today for evaluation of bilateral knee pain. Onset of the symptoms was several years ago, with no known mechanism of injury. Current symptoms include giving out, pain and swelling. Patient denies mechanical symptoms. Pain is aggravated by any weight bearing especially deep squats. Evaluation to date: XRs of bilateral knees which demonstrate no acute fractures or dislocations. Treatment to date: avoidance of offending activity and OTC analgesics which are not very effective. Past Medical History :  Past Medical History:   Diagnosis Date    Chronic kidney disease     Diabetes mellitus ([address])     Hearing loss     Hypothyroidism     Obesity     Osteoarthritis     Thoracic and lumbosacral neuritis     Trochanteric bursitis      Past Surgical History:   Procedure Laterality Date    CYST REMOVAL      ECTOPIC PREGNANCY SURGERY         Allergies :    Allergies   Allergen Reactions    [place] Flavor Hives    Penicillins Nausea Only and Swelling       Medication List :    Current Outpatient Medications   Medication Sig Dispense Refill    atorvastatin (LIPITOR) 40 MG tablet Take 1 tablet by mouth daily 90 tablet 6    SYNTHROID 125 MCG tablet Take 1 tablet by mouth Daily 56 tablet 0    Tirzepatide (MOUNJARO) 2.5 MG/0.5ML SOPN SC injection Inject 0.5 mLs into the skin once a week 4 Adjustable Dose Pre-filled Pen Syringe 1    metoprolol

## 2023-11-09 RX ORDER — VALSARTAN AND HYDROCHLOROTHIAZIDE 320; 12.5 MG/1; MG/1
1 TABLET, FILM COATED ORAL DAILY
Qty: 90 TABLET | Refills: 3 | Status: SHIPPED | OUTPATIENT
Start: 2023-11-09

## 2023-11-15 ENCOUNTER — OFFICE VISIT (OUTPATIENT)
Dept: ORTHOPEDIC SURGERY | Age: 68
End: 2023-11-15

## 2023-11-15 DIAGNOSIS — M25.561 CHRONIC PAIN OF RIGHT KNEE: ICD-10-CM

## 2023-11-15 DIAGNOSIS — G89.29 CHRONIC PAIN OF LEFT KNEE: Primary | ICD-10-CM

## 2023-11-15 DIAGNOSIS — M17.0 PRIMARY OSTEOARTHRITIS OF BOTH KNEES: ICD-10-CM

## 2023-11-15 DIAGNOSIS — G89.29 CHRONIC PAIN OF RIGHT KNEE: ICD-10-CM

## 2023-11-15 DIAGNOSIS — M25.562 CHRONIC PAIN OF LEFT KNEE: Primary | ICD-10-CM

## 2023-11-15 RX ORDER — TRIAMCINOLONE ACETONIDE 40 MG/ML
40 INJECTION, SUSPENSION INTRA-ARTICULAR; INTRAMUSCULAR ONCE
Status: COMPLETED | OUTPATIENT
Start: 2023-11-15 | End: 2023-11-15

## 2023-11-15 RX ORDER — LIDOCAINE HYDROCHLORIDE 10 MG/ML
3 INJECTION, SOLUTION INFILTRATION; PERINEURAL ONCE
Status: COMPLETED | OUTPATIENT
Start: 2023-11-15 | End: 2023-11-15

## 2023-11-15 RX ADMIN — LIDOCAINE HYDROCHLORIDE 3 ML: 10 INJECTION, SOLUTION INFILTRATION; PERINEURAL at 14:38

## 2023-11-15 RX ADMIN — TRIAMCINOLONE ACETONIDE 40 MG: 40 INJECTION, SUSPENSION INTRA-ARTICULAR; INTRAMUSCULAR at 14:38

## 2023-11-15 RX ADMIN — LIDOCAINE HYDROCHLORIDE 3 ML: 10 INJECTION, SOLUTION INFILTRATION; PERINEURAL at 14:37

## 2023-11-15 RX ADMIN — TRIAMCINOLONE ACETONIDE 40 MG: 40 INJECTION, SUSPENSION INTRA-ARTICULAR; INTRAMUSCULAR at 14:39

## 2023-11-15 NOTE — PROGRESS NOTES
[facility]  PRIMARY CARE SPORTS MEDICINE  DATE OF VISIT : 11/15/2023    Patient : Selvin Lino  Age : 77 y.o.  : 1955  MRN : [id number]   ______________________________________________________________________    Chief Complaint :   Chief Complaint   Patient presents with    Knee Pain     Right and left knee  wanting steroid injection  due to really wanted HA but knows has to get steroid first.       HPI : Selvin Lino is 77 y.o. female who presented to the clinic today for bilateral intra-articular corticosteroid injections. Past Medical History :  Past Medical History:   Diagnosis Date    Chronic kidney disease     Diabetes mellitus ([address])     Hearing loss     Hypothyroidism     Obesity     Osteoarthritis     Thoracic and lumbosacral neuritis     Trochanteric bursitis      Past Surgical History:   Procedure Laterality Date    CYST REMOVAL      ECTOPIC PREGNANCY SURGERY         Allergies :    Allergies   Allergen Reactions    [place] Flavor Hives    Penicillins Nausea Only and Swelling       Medication List :    Current Outpatient Medications   Medication Sig Dispense Refill    valsartan-hydroCHLOROthiazide (DIOVAN-HCT) 320-12.5 MG per tablet Take 1 tablet by mouth daily 90 tablet 3    atorvastatin (LIPITOR) 40 MG tablet Take 1 tablet by mouth daily 90 tablet 6    SYNTHROID 125 MCG tablet Take 1 tablet by mouth Daily 56 tablet 0    Tirzepatide (MOUNJARO) 2.5 MG/0.5ML SOPN SC injection Inject 0.5 mLs into the skin once a week 4 Adjustable Dose Pre-filled Pen Syringe 1    metoprolol tartrate (LOPRESSOR) 50 MG tablet 1 tab  tablet 1    hydroCHLOROthiazide (HYDRODIURIL) 25 MG tablet Take 0.5 tablets by mouth every morning 45 tablet 5    metFORMIN (GLUCOPHAGE-XR) 750 MG extended release tablet TAKE 1 TABLET BY MOUTH WITH BREAKFAST AND 1 TABLET BY MOUTH WITH DINNER EVERY  tablet 3     Current Facility-Administered Medications   Medication Dose Route Frequency Provider Last Rate Last Admin

## 2023-11-17 ENCOUNTER — NURSE ONLY (OUTPATIENT)
Dept: PRIMARY CARE CLINIC | Age: 68
End: 2023-11-17

## 2023-11-17 DIAGNOSIS — I10 UNCONTROLLED HYPERTENSION: ICD-10-CM

## 2023-11-17 DIAGNOSIS — E03.9 HYPOTHYROIDISM, UNSPECIFIED TYPE: ICD-10-CM

## 2023-11-17 DIAGNOSIS — Z01.89 ROUTINE LAB DRAW: Primary | ICD-10-CM

## 2023-11-17 DIAGNOSIS — E11.22 TYPE 2 DIABETES MELLITUS WITH STAGE 2 CHRONIC KIDNEY DISEASE, WITHOUT LONG-TERM CURRENT USE OF INSULIN (HCC): ICD-10-CM

## 2023-11-17 DIAGNOSIS — N18.2 TYPE 2 DIABETES MELLITUS WITH STAGE 2 CHRONIC KIDNEY DISEASE, WITHOUT LONG-TERM CURRENT USE OF INSULIN (HCC): ICD-10-CM

## 2023-11-17 LAB
ALBUMIN SERPL-MCNC: 4 G/DL (ref 3.5–5.2)
ALP BLD-CCNC: 82 U/L (ref 35–104)
ALT SERPL-CCNC: 23 U/L (ref 0–32)
ANION GAP SERPL CALCULATED.3IONS-SCNC: 17 MMOL/L (ref 7–16)
AST SERPL-CCNC: 32 U/L (ref 0–31)
BILIRUB SERPL-MCNC: 0.4 MG/DL (ref 0–1.2)
BUN BLDV-MCNC: 32 MG/DL (ref 6–23)
CALCIUM SERPL-MCNC: 9.7 MG/DL (ref 8.6–10.2)
CHLORIDE BLD-SCNC: 106 MMOL/L (ref 98–107)
CO2: 18 MMOL/L (ref 22–29)
CREAT SERPL-MCNC: 0.8 MG/DL (ref 0.5–1)
GFR SERPL CREATININE-BSD FRML MDRD: >60 ML/MIN/1.73M2
GLUCOSE BLD-MCNC: 149 MG/DL (ref 74–99)
POTASSIUM SERPL-SCNC: 4.6 MMOL/L (ref 3.5–5)
SODIUM BLD-SCNC: 141 MMOL/L (ref 132–146)
T4 FREE: 1.8 NG/DL (ref 0.9–1.7)
TOTAL PROTEIN: 7.4 G/DL (ref 6.4–8.3)
TSH SERPL DL<=0.05 MIU/L-ACNC: 0.91 UIU/ML (ref 0.27–4.2)

## 2023-11-29 ENCOUNTER — OFFICE VISIT (OUTPATIENT)
Dept: PRIMARY CARE CLINIC | Age: 68
End: 2023-11-29
Payer: MEDICARE

## 2023-11-29 VITALS
TEMPERATURE: 96.9 F | WEIGHT: 251 LBS | HEIGHT: 64 IN | HEART RATE: 56 BPM | DIASTOLIC BLOOD PRESSURE: 78 MMHG | OXYGEN SATURATION: 99 % | BODY MASS INDEX: 42.85 KG/M2 | SYSTOLIC BLOOD PRESSURE: 128 MMHG

## 2023-11-29 DIAGNOSIS — E11.22 TYPE 2 DIABETES MELLITUS WITH STAGE 2 CHRONIC KIDNEY DISEASE, WITHOUT LONG-TERM CURRENT USE OF INSULIN (HCC): Primary | ICD-10-CM

## 2023-11-29 DIAGNOSIS — E03.9 HYPOTHYROIDISM, UNSPECIFIED TYPE: ICD-10-CM

## 2023-11-29 DIAGNOSIS — N18.2 TYPE 2 DIABETES MELLITUS WITH STAGE 2 CHRONIC KIDNEY DISEASE, WITHOUT LONG-TERM CURRENT USE OF INSULIN (HCC): Primary | ICD-10-CM

## 2023-11-29 DIAGNOSIS — I10 PRIMARY HYPERTENSION: ICD-10-CM

## 2023-11-29 DIAGNOSIS — M17.0 PRIMARY OSTEOARTHRITIS OF BOTH KNEES: ICD-10-CM

## 2023-11-29 DIAGNOSIS — E78.2 MIXED HYPERLIPIDEMIA: ICD-10-CM

## 2023-11-29 DIAGNOSIS — E66.01 OBESITY, CLASS III, BMI 40-49.9 (MORBID OBESITY) (HCC): ICD-10-CM

## 2023-11-29 DIAGNOSIS — H91.93 HEARING DEFICIT, BILATERAL: ICD-10-CM

## 2023-11-29 PROCEDURE — 1123F ACP DISCUSS/DSCN MKR DOCD: CPT | Performed by: INTERNAL MEDICINE

## 2023-11-29 PROCEDURE — 3044F HG A1C LEVEL LT 7.0%: CPT | Performed by: INTERNAL MEDICINE

## 2023-11-29 PROCEDURE — 3078F DIAST BP <80 MM HG: CPT | Performed by: INTERNAL MEDICINE

## 2023-11-29 PROCEDURE — 3074F SYST BP LT 130 MM HG: CPT | Performed by: INTERNAL MEDICINE

## 2023-11-29 PROCEDURE — G8427 DOCREV CUR MEDS BY ELIG CLIN: HCPCS | Performed by: INTERNAL MEDICINE

## 2023-11-29 PROCEDURE — G8400 PT W/DXA NO RESULTS DOC: HCPCS | Performed by: INTERNAL MEDICINE

## 2023-11-29 PROCEDURE — 1036F TOBACCO NON-USER: CPT | Performed by: INTERNAL MEDICINE

## 2023-11-29 PROCEDURE — 1090F PRES/ABSN URINE INCON ASSESS: CPT | Performed by: INTERNAL MEDICINE

## 2023-11-29 PROCEDURE — 3017F COLORECTAL CA SCREEN DOC REV: CPT | Performed by: INTERNAL MEDICINE

## 2023-11-29 PROCEDURE — G8484 FLU IMMUNIZE NO ADMIN: HCPCS | Performed by: INTERNAL MEDICINE

## 2023-11-29 PROCEDURE — G8417 CALC BMI ABV UP PARAM F/U: HCPCS | Performed by: INTERNAL MEDICINE

## 2023-11-29 PROCEDURE — 2022F DILAT RTA XM EVC RTNOPTHY: CPT | Performed by: INTERNAL MEDICINE

## 2023-11-29 PROCEDURE — 99214 OFFICE O/P EST MOD 30 MIN: CPT | Performed by: INTERNAL MEDICINE

## 2023-11-29 RX ORDER — DULAGLUTIDE 0.75 MG/.5ML
0.75 INJECTION, SOLUTION SUBCUTANEOUS WEEKLY
Qty: 4 ADJUSTABLE DOSE PRE-FILLED PEN SYRINGE | Refills: 3 | Status: SHIPPED | OUTPATIENT
Start: 2023-11-29

## 2023-11-29 RX ORDER — LEVOTHYROXINE SODIUM 125 MCG
125 TABLET ORAL DAILY
Qty: 90 TABLET | Refills: 2 | Status: SHIPPED
Start: 2023-11-29 | End: 2023-11-30 | Stop reason: SDUPTHER

## 2023-11-29 ASSESSMENT — ENCOUNTER SYMPTOMS
WHEEZING: 0
VOMITING: 0
NAUSEA: 0
ABDOMINAL PAIN: 0
SORE THROAT: 0
CHEST TIGHTNESS: 0
SHORTNESS OF BREATH: 0
VOICE CHANGE: 0

## 2023-11-29 NOTE — PROGRESS NOTES
HPI:  Patient comes in today for FU ,the knee pain is improved after she got 2 cortisone shots in the right and the left, she states that the left after 2 weeks is starting to hurt again but not as bad as it was, she also got hearing aids and it is working well although she requires a lot of adjustments for it. Review of Systems   Constitutional:  Negative for chills, fever and unexpected weight change. HENT:  Negative for postnasal drip, sore throat and voice change. Respiratory:  Negative for chest tightness, shortness of breath and wheezing. Cardiovascular:  Negative for chest pain and leg swelling. Gastrointestinal:  Negative for abdominal pain, nausea and vomiting. Musculoskeletal:  Positive for arthralgias and gait problem. Skin:  Negative for rash and wound. Psychiatric/Behavioral: Negative. Past Medical History:   Diagnosis Date    Chronic kidney disease     Diabetes mellitus ([address])     Hearing loss     Hypothyroidism     Obesity     Osteoarthritis     Thoracic and lumbosacral neuritis     Trochanteric bursitis      Past Surgical History:   Procedure Laterality Date    CYST REMOVAL      ECTOPIC PREGNANCY SURGERY       No family history on file.    Social History     Tobacco Use    Smoking status: Former     Packs/day: 0.25     Years: 10.00     Additional pack years: 0.00     Total pack years: 2.50     Types: Cigarettes     Quit date: 1981     Years since quittin.9    Smokeless tobacco: Never   Substance Use Topics    Alcohol use: Never    Drug use: Never        Current Outpatient Medications on File Prior to Visit   Medication Sig Dispense Refill    valsartan-hydroCHLOROthiazide (DIOVAN-HCT) 320-12.5 MG per tablet Take 1 tablet by mouth daily 90 tablet 3    atorvastatin (LIPITOR) 40 MG tablet Take 1 tablet by mouth daily 90 tablet 6    metoprolol tartrate (LOPRESSOR) 50 MG tablet 1 tab  tablet 1    hydroCHLOROthiazide (HYDRODIURIL) 25 MG tablet Take 0.5 tablets by

## 2023-11-30 ENCOUNTER — TELEPHONE (OUTPATIENT)
Dept: PRIMARY CARE CLINIC | Age: 68
End: 2023-11-30

## 2023-11-30 RX ORDER — LEVOTHYROXINE SODIUM 0.12 MG/1
125 TABLET ORAL DAILY
COMMUNITY
End: 2023-12-01 | Stop reason: SDUPTHER

## 2023-11-30 RX ORDER — LEVOTHYROXINE SODIUM 125 MCG
125 TABLET ORAL DAILY
Qty: 30 TABLET | Refills: 0 | Status: SHIPPED
Start: 2023-11-30 | End: 2023-12-01

## 2023-11-30 NOTE — TELEPHONE ENCOUNTER
Pt called asking for a call from clinical   Has questions about her thyroid medication     Patient also wanted me to send a message she is completely out of thyroid medication     Asking for a call as soon as possible

## 2023-11-30 NOTE — TELEPHONE ENCOUNTER
Patient wants to know why she has to have brand name synthroid she wants to go back to generic due to the price of the medication.

## 2023-12-01 RX ORDER — LEVOTHYROXINE SODIUM 0.12 MG/1
125 TABLET ORAL DAILY
Qty: 90 TABLET | Refills: 1 | Status: SHIPPED | OUTPATIENT
Start: 2023-12-01

## 2024-03-20 ENCOUNTER — COMMUNITY OUTREACH (OUTPATIENT)
Dept: PRIMARY CARE CLINIC | Age: 69
End: 2024-03-20

## 2024-03-21 ENCOUNTER — HOSPITAL ENCOUNTER (OUTPATIENT)
Age: 69
Discharge: HOME | End: 2024-03-23

## 2024-03-25 LAB — SURGICAL PATHOLOGY REPORT: NORMAL
